# Patient Record
Sex: FEMALE | HISPANIC OR LATINO
[De-identification: names, ages, dates, MRNs, and addresses within clinical notes are randomized per-mention and may not be internally consistent; named-entity substitution may affect disease eponyms.]

---

## 2017-06-26 ENCOUNTER — HOSPITAL (OUTPATIENT)
Dept: OTHER | Age: 42
End: 2017-06-26
Attending: OTOLARYNGOLOGY

## 2018-05-14 PROBLEM — M72.2 PLANTAR FASCIITIS, BILATERAL: Status: ACTIVE | Noted: 2018-05-14

## 2018-05-14 PROBLEM — B35.3 TINEA PEDIS OF BOTH FEET: Status: ACTIVE | Noted: 2018-05-14

## 2019-03-18 ENCOUNTER — OFFICE VISIT (OUTPATIENT)
Dept: PHYSICAL THERAPY | Facility: HOSPITAL | Age: 44
End: 2019-03-18
Attending: FAMILY MEDICINE
Payer: COMMERCIAL

## 2019-03-18 PROCEDURE — 97162 PT EVAL MOD COMPLEX 30 MIN: CPT

## 2019-03-18 PROCEDURE — 97110 THERAPEUTIC EXERCISES: CPT

## 2019-03-18 NOTE — PROGRESS NOTES
SHOULDER EVALUATION:    Referring Haily Bruce MD      Insurance  : [facility] PPO         DX:M25.512 Left shoulder pain PT DX: Impingement Syndrome, posture syndrome,n   Jigar Ferrara MD       Age: 43 Occupation:psychologist,     DOI: about a m (Degrees) (R) (L) Strength:    MMT (Scale of 0-5) (R) (L)   Position: Active Passive Active Passive Shoulder flexion:  5 4+   Shoulder flexion: 135 165  120pn 125pn  Shoulder extension: 5 5-   Shoulder extension: 60 62  52pn 53pn  Abduction: 5 4+pn   Abduc In agreement with FOTO score and clinical rationale, this evaluation involved Moderate Complexity decision making due to 1-2 personal factors/comorbidities, 3 body structures involved/activity limitations, and evolving symptoms including changing pain leve · Pt will improve shoulder abduction AROM to 140> degrees to improve ability to don deodorant, don/doff shirts, and comb hair  · Pt will increase right shoulder painfree AROM to 85 deg ER and 130 deg ABD to reach and fasten seatbelt   · Pt will increase sh

## 2019-03-20 ENCOUNTER — OFFICE VISIT (OUTPATIENT)
Dept: PHYSICAL THERAPY | Facility: HOSPITAL | Age: 44
End: 2019-03-20
Attending: FAMILY MEDICINE
Payer: COMMERCIAL

## 2019-03-20 PROCEDURE — 97110 THERAPEUTIC EXERCISES: CPT

## 2019-03-20 PROCEDURE — 97140 MANUAL THERAPY 1/> REGIONS: CPT

## 2019-03-20 NOTE — PROGRESS NOTES
Diagnosis: M25.512 Left shoulder pain  Authorized # of Visits:  12 [employer] PPO         Next MD visit: none scheduled  Fall Risk: standard         Precautions: None significant           Medication Changes since last visit?: No  Subjective: Pt states that Rip Turner · Pt will improve shoulder strength throughout to 4+/5 to improve function with ADLs including lifting plates and light objects  · Pt will demonstrate increased mid/low trap strength to 4/5 to promote improved shoulder mechanics and stabilization with AD

## 2019-03-25 ENCOUNTER — OFFICE VISIT (OUTPATIENT)
Dept: PHYSICAL THERAPY | Facility: HOSPITAL | Age: 44
End: 2019-03-25
Attending: FAMILY MEDICINE
Payer: COMMERCIAL

## 2019-03-25 PROCEDURE — 97140 MANUAL THERAPY 1/> REGIONS: CPT

## 2019-03-25 PROCEDURE — 97110 THERAPEUTIC EXERCISES: CPT

## 2019-03-25 NOTE — PROGRESS NOTES
Diagnosis: M25.512 Left shoulder pain  Authorized # of Visits:  12 [employer] PPO         Next MD visit: none scheduled  Fall Risk: standard         Precautions: None significant           Medication Changes since last visit?: No  Subjective: Pt states that pain · Pt will improve shoulder flexion PROM to > 130 degrees and AROM to 135 deg to be able to reach into overhead cabinets to grab plates  · Pt will improve shoulder abduction AROM to 140> degrees to improve ability to don deodorant, don/doff shirts, and comb

## 2019-03-27 ENCOUNTER — OFFICE VISIT (OUTPATIENT)
Dept: PHYSICAL THERAPY | Facility: HOSPITAL | Age: 44
End: 2019-03-27
Attending: FAMILY MEDICINE
Payer: COMMERCIAL

## 2019-03-27 PROCEDURE — 97140 MANUAL THERAPY 1/> REGIONS: CPT

## 2019-03-27 PROCEDURE — 97110 THERAPEUTIC EXERCISES: CPT

## 2019-03-27 NOTE — PROGRESS NOTES
Diagnosis: M25.512 Left shoulder pain  Authorized # of Visits:  12 [employer] PPO         Next MD visit: none scheduled  Fall Risk: standard         Precautions: None significant           Medication Changes since last visit?: No  Subjective: Pt states that L sh posture with lumbar support, supine lying with cervical roll   HEP:  Chin tucks with neck extensions  Icing post ex. HEP:           Assessment: Pt with flareup over weekend , likely impingement from anterior humeral head position.  Pt experienced L UE symp

## 2019-04-01 ENCOUNTER — OFFICE VISIT (OUTPATIENT)
Dept: PHYSICAL THERAPY | Facility: HOSPITAL | Age: 44
End: 2019-04-01
Attending: FAMILY MEDICINE
Payer: COMMERCIAL

## 2019-04-01 PROCEDURE — 97140 MANUAL THERAPY 1/> REGIONS: CPT

## 2019-04-01 PROCEDURE — 97110 THERAPEUTIC EXERCISES: CPT

## 2019-04-03 ENCOUNTER — OFFICE VISIT (OUTPATIENT)
Dept: PHYSICAL THERAPY | Facility: HOSPITAL | Age: 44
End: 2019-04-03
Attending: FAMILY MEDICINE
Payer: COMMERCIAL

## 2019-04-03 PROCEDURE — 97140 MANUAL THERAPY 1/> REGIONS: CPT

## 2019-04-03 PROCEDURE — 97110 THERAPEUTIC EXERCISES: CPT

## 2019-04-03 NOTE — PROGRESS NOTES
Diagnosis: M25.512 Left shoulder pain  Authorized # of Visits:  12 [employer] PPO         Next MD visit: none scheduled  Fall Risk: standard         Precautions: None significant           Medication Changes since last visit?: No  Subjective: Pt states that L sh cerv retract/ext x 10-decr,  -Seated cerv retract/ext/rot-decr, B  -Seated thoracic ext over ball on chair.  2 x 10  -Supine pec stretch on 1/2 foam roll x 2 min  -Open books x 10; then 15\" hold x 1 r/l     -Instructed in proper donning of jacket to protec training, manual therapy, balance and proprioception training. .        Charges:  Therex2, man x 1       Total Timed Treatment: 42min  Total Treatment Time: 44 min

## 2019-04-08 ENCOUNTER — OFFICE VISIT (OUTPATIENT)
Dept: PHYSICAL THERAPY | Facility: HOSPITAL | Age: 44
End: 2019-04-08
Attending: FAMILY MEDICINE
Payer: COMMERCIAL

## 2019-04-08 PROCEDURE — 97140 MANUAL THERAPY 1/> REGIONS: CPT

## 2019-04-08 PROCEDURE — 97110 THERAPEUTIC EXERCISES: CPT

## 2019-04-08 NOTE — PROGRESS NOTES
Diagnosis: M25.512 Left shoulder pain  Authorized # of Visits:  12 [employer] PPO         Next MD visit: none scheduled  Fall Risk: standard         Precautions: None significant           Medication Changes since last visit?: No  Subjective: Pt states that she 10  -Stabilizer PB 20-22mmHg chin nods 3 x 10  -Open books 5\" x 10 r/l  -Supine pec stretch on 1/2 FR x 2 min  -Supine 1/2 FR snow angels x 5  -Prone W 2 x 10     EX:  -Supine 90/90 IR to neutral RTB 2 x 10  -S/L ER towel in axilla 1# 3 x 10  -S/L sh abd

## 2019-04-10 ENCOUNTER — OFFICE VISIT (OUTPATIENT)
Dept: PHYSICAL THERAPY | Facility: HOSPITAL | Age: 44
End: 2019-04-10
Attending: FAMILY MEDICINE
Payer: COMMERCIAL

## 2019-04-10 PROCEDURE — 97110 THERAPEUTIC EXERCISES: CPT

## 2019-04-10 PROCEDURE — 97140 MANUAL THERAPY 1/> REGIONS: CPT

## 2019-04-10 NOTE — PROGRESS NOTES
Patient Name: Marcia Green, : 1975, MRN: [id number]   Date:  4/10/2019  Referring Physician:  Janette Ramos    Diagnosis: M25.512 Left shoulder pain    Progress Summary    Pt has attended 8, cancelled 0, and no shown 0 visits in Physical Therapy reach into overhead cabinets to grab plates  Mostly MET  · Pt will improve shoulder abduction AROM to 140> degrees to improve ability to don deodorant, don/doff shirts, and comb hair  MET  · Pt will increase right shoulder painfree AROM to 85 deg ER and 13 MT:  -Manual cerv traction  -DUANE jjessie mobs L inf and post glides MT:   - jt mobs L inf and post glides grade III  -Pec minor stretch 30\" x 4   -LLLD stretch for ER  -Manual cerv traction  -CFM massage f/b ice massage to L biceps tendon MT:  -continued [address]

## 2019-04-24 ENCOUNTER — OFFICE VISIT (OUTPATIENT)
Dept: PHYSICAL THERAPY | Facility: HOSPITAL | Age: 44
End: 2019-04-24
Attending: FAMILY MEDICINE
Payer: COMMERCIAL

## 2019-04-24 PROCEDURE — 97110 THERAPEUTIC EXERCISES: CPT

## 2019-04-24 PROCEDURE — 97140 MANUAL THERAPY 1/> REGIONS: CPT

## 2019-04-24 NOTE — PROGRESS NOTES
Diagnosis: M25.512 Left shoulder pain  Authorized # of Visits:  12 [employer] PPO         Next MD visit: none scheduled  Fall Risk: standard         Precautions: None significant           Medication Changes since last visit?: No  Subjective: Pt states that her tolerate position    HEP: B ER with red TB; issued red theraband  HEP: Thoracic ext over ball on chair, prone horz abd, B ER with red TB           Assessment: Pt is reporting improving function; still has pain with protraction.   Pt reports exacerbation of

## 2019-04-29 ENCOUNTER — OFFICE VISIT (OUTPATIENT)
Dept: PHYSICAL THERAPY | Facility: HOSPITAL | Age: 44
End: 2019-04-29
Attending: FAMILY MEDICINE
Payer: COMMERCIAL

## 2019-04-29 PROCEDURE — 97110 THERAPEUTIC EXERCISES: CPT

## 2019-04-29 PROCEDURE — 97140 MANUAL THERAPY 1/> REGIONS: CPT

## 2019-04-29 NOTE — PROGRESS NOTES
Diagnosis: M25.512 Left shoulder pain  Authorized # of Visits:  12 [employer] PPO         Next MD visit: none scheduled  Fall Risk: standard         Precautions: None significant           Medication Changes since last visit?: No  Subjective: Pt states that her protraction and hand behind back at times. Pt reports improvement in ability to perform ADL's.     Goals:         Pt  will report improved ability to sleep without waking due to shoulder pain  · Patient will be able to turn head to right and left approximat

## 2019-05-01 ENCOUNTER — OFFICE VISIT (OUTPATIENT)
Dept: PHYSICAL THERAPY | Facility: HOSPITAL | Age: 44
End: 2019-05-01
Attending: FAMILY MEDICINE
Payer: COMMERCIAL

## 2019-05-01 PROCEDURE — 97140 MANUAL THERAPY 1/> REGIONS: CPT

## 2019-05-01 PROCEDURE — 97110 THERAPEUTIC EXERCISES: CPT

## 2019-05-01 NOTE — PROGRESS NOTES
Patient Name: General Nichols : 1975, MRN: [id number]   Date:  2019  Referring Physician:  Vanessa Bhatia MD  Fax: [phone]    Diagnosis: Left shoulder Pain    Progress Summary    Pt has attended 11, cancelled 1, and no shown 0 visits in abnormal   Apprehension:  - -   - Joint Mobility: NT  decreased post glide   Decreased post glide   Willson’s:      NT  Lift off test  NT NT     Cervical distraction test descreased p/n LUE     Decreased symptoms          ULTT median N. + left     NT   with ADL such as lifting and reaching  MOSTLY MET  · Pt will be independent and compliant with comprehensive HEP to maintain progress achieved in PT  MET    Plan: May benefit from additional 4-6 sessions of  PT with Dr's permission. ( see assessment)  Will 42min  Total Treatment Time: 46 min

## 2019-05-03 ENCOUNTER — TELEPHONE (OUTPATIENT)
Dept: PHYSICAL THERAPY | Facility: HOSPITAL | Age: 44
End: 2019-05-03

## 2019-05-06 ENCOUNTER — TELEPHONE (OUTPATIENT)
Dept: PHYSICAL THERAPY | Facility: HOSPITAL | Age: 44
End: 2019-05-06

## 2019-10-09 ENCOUNTER — OFFICE VISIT (OUTPATIENT)
Dept: INTERNAL MEDICINE CLINIC | Facility: CLINIC | Age: 44
End: 2019-10-09
Payer: COMMERCIAL

## 2019-10-09 VITALS
DIASTOLIC BLOOD PRESSURE: 72 MMHG | BODY MASS INDEX: 29.99 KG/M2 | HEIGHT: 63.5 IN | WEIGHT: 171.38 LBS | HEART RATE: 86 BPM | SYSTOLIC BLOOD PRESSURE: 100 MMHG | OXYGEN SATURATION: 98 % | TEMPERATURE: 98 F

## 2019-10-09 DIAGNOSIS — R21 RASH: ICD-10-CM

## 2019-10-09 DIAGNOSIS — Z12.39 SCREENING FOR BREAST CANCER: ICD-10-CM

## 2019-10-09 DIAGNOSIS — Z12.4 SCREENING FOR CERVICAL CANCER: ICD-10-CM

## 2019-10-09 DIAGNOSIS — M25.512 LEFT SHOULDER PAIN, UNSPECIFIED CHRONICITY: ICD-10-CM

## 2019-10-09 DIAGNOSIS — Z00.00 ANNUAL PHYSICAL EXAM: Primary | ICD-10-CM

## 2019-10-09 PROBLEM — M72.2 PLANTAR FASCIITIS, BILATERAL: Status: RESOLVED | Noted: 2018-05-14 | Resolved: 2019-10-09

## 2019-10-09 PROCEDURE — 99386 PREV VISIT NEW AGE 40-64: CPT | Performed by: INTERNAL MEDICINE

## 2019-10-09 RX ORDER — CLOTRIMAZOLE AND BETAMETHASONE DIPROPIONATE 10; .64 MG/G; MG/G
CREAM TOPICAL
Qty: 60 G | Refills: 3 | Status: SHIPPED | OUTPATIENT
Start: 2019-10-09 | End: 2020-09-21 | Stop reason: ALTCHOICE

## 2019-10-09 RX ORDER — FLUTICASONE PROPIONATE 50 MCG
SPRAY, SUSPENSION (ML) NASAL
COMMUNITY
End: 2020-11-02

## 2019-10-09 RX ORDER — NAPROXEN 500 MG/1
500 TABLET ORAL AS NEEDED
COMMUNITY

## 2019-10-10 NOTE — PROGRESS NOTES
HPI:   Rocky Bedoya is a 45year old female who presents for a complete physical exam.Her period is regular ,   She is complaining of rash on bilateral arms which is ongoing for couple of weeks. She tried over-the-counter medication but did not help. ENMT Negative Hearing loss, nasal drainage, pain in/around ear, sinus pressure and sore throat. Eyes Negative Eye pain and vision changes. Respiratory Negative Cough, dyspnea and wheezing.    Cardio Negative Chest pain, claudication, edema and irregul Dorsalis pedis: Normal. Bruits - Carotids: Absent. Extremity Normal No edema. No varicosities. Abdomen Normal Inspection normal, BS active.  No abdominal tenderness or masses palpable   Genitourinary Normal External genitalia, urethra - Normal. Vagina,

## 2019-10-10 NOTE — PATIENT INSTRUCTIONS
israel Redmond is a 45year old female who presents for a complete physical exam. Pap and pelvic done. Self breast exam explained.    Health maintenance: patient is due for  Pap mere done today   Rash on bilateral arms -try topical clotrimazole betamethason

## 2019-10-21 ENCOUNTER — HOSPITAL ENCOUNTER (OUTPATIENT)
Dept: MAMMOGRAPHY | Facility: HOSPITAL | Age: 44
Discharge: HOME | End: 2019-10-21
Attending: INTERNAL MEDICINE
Payer: COMMERCIAL

## 2019-10-21 ENCOUNTER — LAB ENCOUNTER (OUTPATIENT)
Dept: LAB | Facility: HOSPITAL | Age: 44
End: 2019-10-21
Attending: INTERNAL MEDICINE
Payer: COMMERCIAL

## 2019-10-21 DIAGNOSIS — Z12.39 SCREENING FOR BREAST CANCER: ICD-10-CM

## 2019-10-21 DIAGNOSIS — Z00.00 ANNUAL PHYSICAL EXAM: ICD-10-CM

## 2019-10-21 PROCEDURE — 80061 LIPID PANEL: CPT

## 2019-10-21 PROCEDURE — 77063 BREAST TOMOSYNTHESIS BI: CPT | Performed by: INTERNAL MEDICINE

## 2019-10-21 PROCEDURE — 83036 HEMOGLOBIN GLYCOSYLATED A1C: CPT

## 2019-10-21 PROCEDURE — 84443 ASSAY THYROID STIM HORMONE: CPT

## 2019-10-21 PROCEDURE — 80053 COMPREHEN METABOLIC PANEL: CPT

## 2019-10-21 PROCEDURE — 85025 COMPLETE CBC W/AUTO DIFF WBC: CPT

## 2019-10-21 PROCEDURE — 36415 COLL VENOUS BLD VENIPUNCTURE: CPT

## 2019-10-21 PROCEDURE — 77067 SCR MAMMO BI INCL CAD: CPT | Performed by: INTERNAL MEDICINE

## 2019-11-29 ENCOUNTER — HOSPITAL ENCOUNTER (EMERGENCY)
Facility: HOSPITAL | Age: 44
Discharge: HOME | End: 2019-11-29
Attending: EMERGENCY MEDICINE
Payer: COMMERCIAL

## 2019-11-29 ENCOUNTER — APPOINTMENT (OUTPATIENT)
Dept: ULTRASOUND IMAGING | Facility: HOSPITAL | Age: 44
End: 2019-11-29
Attending: EMERGENCY MEDICINE
Payer: COMMERCIAL

## 2019-11-29 ENCOUNTER — NURSE TRIAGE (OUTPATIENT)
Dept: OTHER | Age: 44
End: 2019-11-29

## 2019-11-29 VITALS
RESPIRATION RATE: 18 BRPM | DIASTOLIC BLOOD PRESSURE: 77 MMHG | HEART RATE: 80 BPM | HEIGHT: 64 IN | BODY MASS INDEX: 28.17 KG/M2 | WEIGHT: 165 LBS | TEMPERATURE: 98 F | OXYGEN SATURATION: 98 % | SYSTOLIC BLOOD PRESSURE: 120 MMHG

## 2019-11-29 DIAGNOSIS — N20.1 URETEROLITHIASIS: Primary | ICD-10-CM

## 2019-11-29 PROCEDURE — 81001 URINALYSIS AUTO W/SCOPE: CPT | Performed by: EMERGENCY MEDICINE

## 2019-11-29 PROCEDURE — 85025 COMPLETE CBC W/AUTO DIFF WBC: CPT | Performed by: EMERGENCY MEDICINE

## 2019-11-29 PROCEDURE — 76775 US EXAM ABDO BACK WALL LIM: CPT | Performed by: EMERGENCY MEDICINE

## 2019-11-29 PROCEDURE — 81001 URINALYSIS AUTO W/SCOPE: CPT

## 2019-11-29 PROCEDURE — 80048 BASIC METABOLIC PNL TOTAL CA: CPT | Performed by: EMERGENCY MEDICINE

## 2019-11-29 PROCEDURE — 85025 COMPLETE CBC W/AUTO DIFF WBC: CPT

## 2019-11-29 PROCEDURE — 99284 EMERGENCY DEPT VISIT MOD MDM: CPT

## 2019-11-29 PROCEDURE — 80048 BASIC METABOLIC PNL TOTAL CA: CPT

## 2019-11-29 PROCEDURE — 96374 THER/PROPH/DIAG INJ IV PUSH: CPT

## 2019-11-29 PROCEDURE — 81025 URINE PREGNANCY TEST: CPT

## 2019-11-29 RX ORDER — KETOROLAC TROMETHAMINE 30 MG/ML
30 INJECTION, SOLUTION INTRAMUSCULAR; INTRAVENOUS ONCE
Status: COMPLETED | OUTPATIENT
Start: 2019-11-29 | End: 2019-11-29

## 2019-11-29 RX ORDER — KETOROLAC TROMETHAMINE 10 MG/1
10 TABLET, FILM COATED ORAL EVERY 6 HOURS PRN
Qty: 20 TABLET | Refills: 0 | Status: SHIPPED | OUTPATIENT
Start: 2019-11-29 | End: 2019-12-04

## 2019-11-29 RX ORDER — HYDROCODONE BITARTRATE AND ACETAMINOPHEN 5; 325 MG/1; MG/1
1 TABLET ORAL EVERY 6 HOURS PRN
Qty: 10 TABLET | Refills: 0 | Status: SHIPPED | OUTPATIENT
Start: 2019-11-29 | End: 2020-03-03 | Stop reason: ALTCHOICE

## 2019-11-29 RX ORDER — TAMSULOSIN HYDROCHLORIDE 0.4 MG/1
0.4 CAPSULE ORAL DAILY
Qty: 14 CAPSULE | Refills: 0 | Status: SHIPPED | OUTPATIENT
Start: 2019-11-29 | End: 2019-12-13

## 2019-11-29 RX ORDER — ONDANSETRON 2 MG/ML
4 INJECTION INTRAMUSCULAR; INTRAVENOUS ONCE
Status: DISCONTINUED | OUTPATIENT
Start: 2019-11-29 | End: 2019-11-29

## 2019-11-29 NOTE — TELEPHONE ENCOUNTER
----- Message from Reuben Cheadle sent at 11/29/2019  4:09 PM CST -----  Regarding: Non-Urgent Medical Question  Contact: [phone]  Hi Doctor- I am having sharp pains on my right side below my ribs as well as dull pain the right upper side of ribs.  I

## 2019-11-29 NOTE — TELEPHONE ENCOUNTER
Action Requested: Summary for Provider     []  Critical Lab, Recommendations Needed  [] Need Additional Advice  []   FYI    []   Need Orders  [] Need Medications Sent to Pharmacy  []  Other     SUMMARY: pt states she is having right sided upper abdominal p

## 2019-11-30 NOTE — TELEPHONE ENCOUNTER
Patient seen at [facility] ER yesterday. Disposition and Plan    Clinical Impression:  Ureterolithiasis  (primary encounter diagnosis)     Disposition:  Discharge     Follow-up:   Jamilah Alfred Ra [id number] Aranda

## 2019-12-02 ENCOUNTER — OFFICE VISIT (OUTPATIENT)
Dept: DERMATOLOGY CLINIC | Facility: CLINIC | Age: 44
End: 2019-12-02
Payer: COMMERCIAL

## 2019-12-02 DIAGNOSIS — L30.8 PSORIASIFORM DERMATITIS: ICD-10-CM

## 2019-12-02 DIAGNOSIS — L82.1 SEBORRHEIC KERATOSES: ICD-10-CM

## 2019-12-02 DIAGNOSIS — L30.1 DYSHIDROSIS (POMPHOLYX): Primary | ICD-10-CM

## 2019-12-02 DIAGNOSIS — D22.9 MULTIPLE NEVI: ICD-10-CM

## 2019-12-02 DIAGNOSIS — D23.9 BENIGN NEOPLASM OF SKIN, UNSPECIFIED LOCATION: ICD-10-CM

## 2019-12-02 PROCEDURE — 99203 OFFICE O/P NEW LOW 30 MIN: CPT | Performed by: DERMATOLOGY

## 2019-12-02 RX ORDER — AMMONIUM LACTATE 12 G/100G
1 CREAM TOPICAL 2 TIMES DAILY
Qty: 385 G | Refills: 11 | Status: SHIPPED | OUTPATIENT
Start: 2019-12-02 | End: 2020-01-01

## 2019-12-02 RX ORDER — FLUOCINONIDE 0.5 MG/G
OINTMENT TOPICAL
Qty: 60 G | Refills: 3 | Status: SHIPPED | OUTPATIENT
Start: 2019-12-02

## 2019-12-02 NOTE — TELEPHONE ENCOUNTER
If she does not want to follow-up with me after ER visit is fine she can schedule appointment with urology we usually want to see patient after ER visit just to make sure everything is okay.

## 2019-12-02 NOTE — TELEPHONE ENCOUNTER
Spoke with the patient and informed her of Dr. Jeanie Landry message from below. Patient voiced understanding and reports she will schedule an appointment with urology.

## 2019-12-02 NOTE — TELEPHONE ENCOUNTER
Patient reports has been doing better after ED visit, confirmed to have a kidney stone, has pain medication that has been helping to manage her pain.  Pt reports was given a urology referral for follow up appt, patient wanting to know if ok to follow up dir

## 2019-12-06 NOTE — ED PROVIDER NOTES
Patient Seen in: [facility] Emergency Department    History   Patient presents with:  Abdomen/Flank Pain (GI/)      HPI    Patient presents to the ED complaining of right-sided flank pain for the past 2 days. Pain is sharp and severe at times. appears well-developed and well-nourished. No distress. HENT:   Head: Normocephalic and atraumatic. Eyes: Conjunctivae are normal. Right eye exhibits no discharge. Left eye exhibits no discharge. Neck: No tracheal deviation present.    Cardiovascular: DRAW GOLD   CBC W/ DIFFERENTIAL         Imaging Results Available and Reviewed while in ED:    PROCEDURE:  US KIDNEYS (EBO=[id number])     COMPARISON: None.      INDICATIONS:  R flank pain     TECHNIQUE:    Ultrasound examination was performed to visualize the k consistent with hematuria and no infection. After informed decision-making the patient elected ultrasound imaging. Ultrasound shows no significant abnormalities. Likely stone passage versus lack of hydroureter at this time.   Patient comfort going home w Picato Counseling:  I discussed with the patient the risks of Picato including but not limited to erythema, scaling, itching, weeping, crusting, and pain.

## 2019-12-11 ENCOUNTER — OFFICE VISIT (OUTPATIENT)
Dept: PHYSICAL THERAPY | Age: 44
End: 2019-12-11
Attending: INTERNAL MEDICINE
Payer: COMMERCIAL

## 2019-12-11 DIAGNOSIS — M25.512 LEFT SHOULDER PAIN, UNSPECIFIED CHRONICITY: ICD-10-CM

## 2019-12-11 PROCEDURE — 97110 THERAPEUTIC EXERCISES: CPT

## 2019-12-11 PROCEDURE — 97161 PT EVAL LOW COMPLEX 20 MIN: CPT

## 2019-12-11 NOTE — PROGRESS NOTES
PT EVALUATION:   Referring Physician: Dr. Carly Galindo  Diagnosis:      Left shoulder pain, unspecified chronicity (M25.512) Date of Service: 12/11/2019     PATIENT SUMMARY   Belinda Connor is a 45year old female who presents to therapy today with complaints denies diplopia, dysarthria, dysphasia, dizziness, drop attacks, bowel/bladder changes, saddle anesthesia, and EMORY LE N/T.      ASSESSMENT  Meet Paul presents to physical therapy evaluation with primary c/o L shoulder pain with radiating symptoms into her L sh glenohumeral joint    Palpation: TTP at L upper trap    Strength: (* denotes performed with pain)  UE/Scapular   Shoulder Flex: R 5/5, L 4/5  Shoulder ABD (C5): R 5/5, L 4 -/5*  Shoulder Ext: R 5 , L 5  Shoulder IR: R 5, L 5  Shoulder ER: R 5, L 4 - *  Bic pain.      Frequency / Duration: Patient will be seen for 2 x/week or a total of 6 visits over a 90 day period.  Treatment will include: Manual Therapy, Neuromuscular Re-education, Self-Care Home Management, Therapeutic Activities, Therapeutic Exercise, Giulia

## 2019-12-15 NOTE — PROGRESS NOTES
Belinda Connor is a 45year old female. HPI:     CC:  Patient presents with:  Derm Problem: NEW PATIENT - Pt presenting for cracking skin on fingers, heels, and feet. Pt states cracks are deep and painful. Pt states they start like blisters.   Pt states 10 MG Oral Cap Take 10 mg by mouth.      • naproxen 500 MG Oral Tab naproxen 500 mg tablet   TK 1 T PO BID FOR 5 DAYS THEN PRN     • clotrimazole-betamethasone 1-0.05 % External Cream Apply to affected are bid 60 g 3   • loratadine 10 MG Oral Tab Take 10 mg organizations: Not on file        Relationship status: Not on file      Intimate partner violence:        Fear of current or ex partner: Not on file        Emotionally abused: Not on file        Physically abused: Not on file        Forced sexual activity: umbilicus       metronidazole and pimecrolimus prescribed for facial rash redness. Patient noted severe irritation with the pimecrolimus. Does not recall whether the metronidazole was helpful. Patient presents with concerns above.     Patient has been in of tapioca like vesicles and superficial papules pustules fissuring scaling noted. No evidence of tinea. Nails are fairly normal.  Hyper linearity, xerotic changes over the hands. Nails okay. Chronic psoriasiform dermatitis, dyshidrosis.   Mupirocin to above.    This note was generated using Dragon voice recognition software. Please contact me regarding any confusion resulting from errors in recognition. Damir Faye

## 2019-12-18 ENCOUNTER — HOSPITAL ENCOUNTER (OUTPATIENT)
Dept: CT IMAGING | Facility: HOSPITAL | Age: 44
Discharge: HOME | End: 2019-12-18
Attending: UROLOGY
Payer: COMMERCIAL

## 2019-12-18 DIAGNOSIS — R10.9 FLANK PAIN: ICD-10-CM

## 2019-12-18 DIAGNOSIS — N20.0 KIDNEY STONE: ICD-10-CM

## 2019-12-18 PROCEDURE — 74176 CT ABD & PELVIS W/O CONTRAST: CPT | Performed by: UROLOGY

## 2019-12-24 ENCOUNTER — OFFICE VISIT (OUTPATIENT)
Dept: PHYSICAL THERAPY | Age: 44
End: 2019-12-24
Attending: INTERNAL MEDICINE
Payer: COMMERCIAL

## 2019-12-24 PROCEDURE — 97140 MANUAL THERAPY 1/> REGIONS: CPT

## 2019-12-24 PROCEDURE — 97110 THERAPEUTIC EXERCISES: CPT

## 2019-12-24 NOTE — PROGRESS NOTES
Dx:     Left shoulder pain, unspecified chronicity (M25.512)     Insurance (Authorized # of Visits):  6 visits; cert ends 5/73/1340         Authorizing Physician: Dr. Angel Meier MD  Next MD visit: none scheduled  Fall Risk: standard         Precautions: n/a serratus 10x2  Supine: Cane shoulder flex 10x2  Standing: shoulder flex wall slide 10x2  Codman's pendulum 20x each dir  Sitting: shoulder flex finger wiggles 10x2  Sitting: scap retraction 10x2       Manual Therapy  Sidelying: L scapulothoracic mobs all d

## 2019-12-31 ENCOUNTER — OFFICE VISIT (OUTPATIENT)
Dept: PHYSICAL THERAPY | Age: 44
End: 2019-12-31
Attending: INTERNAL MEDICINE
Payer: COMMERCIAL

## 2019-12-31 PROCEDURE — 97110 THERAPEUTIC EXERCISES: CPT

## 2019-12-31 PROCEDURE — 97140 MANUAL THERAPY 1/> REGIONS: CPT

## 2019-12-31 NOTE — PROGRESS NOTES
Dx:     Left shoulder pain, unspecified chronicity (M25.512)     Insurance (Authorized # of Visits):  6 visits; cert ends 9/30/2506         Authorizing Physician: Dr. Kathryn Grossman MD  Next MD visit: none scheduled  Fall Risk: standard         Precautions: n/a 12/24/2019  TX#: 2/6 Date: 12/31/2019              TX#: 3/6 Date:                 TX#: 4/ Date:                 TX#: 5/ Date:    Tx#: 6/   Therapeutic Exercise  Supine: cane serratus 10x2  Supine: Cane shoulder flex 10x2  Standing: shoulder flex wall slide

## 2020-01-08 ENCOUNTER — OFFICE VISIT (OUTPATIENT)
Dept: PHYSICAL THERAPY | Age: 45
End: 2020-01-08
Attending: INTERNAL MEDICINE
Payer: COMMERCIAL

## 2020-01-08 PROCEDURE — 97110 THERAPEUTIC EXERCISES: CPT

## 2020-01-08 PROCEDURE — 97140 MANUAL THERAPY 1/> REGIONS: CPT

## 2020-01-08 NOTE — PROGRESS NOTES
Dx:     Left shoulder pain, unspecified chronicity (M25.512)     Insurance (Authorized # of Visits):  6 visits; cert ends 6/90/0311         Authorizing Physician: Dr. Cherie Mcdonough MD  Next MD visit: none scheduled  Fall Risk: standard         Precautions: n/a flex 10x2  Standing: shoulder flex wall slide 10x2  Codman's pendulum 20x each dir  Sitting: shoulder flex finger wiggles 10x2  Sitting: scap retraction 10x2 Therapeutic Exercise  Supine: cane serratus 10x2  Supine: Cane shoulder flex 10x2  Standing: shoul

## 2020-01-15 ENCOUNTER — OFFICE VISIT (OUTPATIENT)
Dept: PHYSICAL THERAPY | Age: 45
End: 2020-01-15
Attending: INTERNAL MEDICINE
Payer: COMMERCIAL

## 2020-01-15 PROCEDURE — 97110 THERAPEUTIC EXERCISES: CPT

## 2020-01-15 PROCEDURE — 97140 MANUAL THERAPY 1/> REGIONS: CPT

## 2020-01-15 NOTE — PROGRESS NOTES
Dx:     Left shoulder pain, unspecified chronicity (M25.512)     Insurance (Authorized # of Visits):  6 visits; cert ends 3/37/0092         Authorizing Physician: Dr. Amber Pleitez MD  Next MD visit: none scheduled  Fall Risk: standard         Precautions: n/a Date:   Tx#: 6/   Therapeutic Exercise  Supine: cane serratus 10x2  Supine: Cane shoulder flex 10x2  Standing: shoulder flex wall slide 10x2  Codman's pendulum 20x each dir  Sitting: shoulder flex finger wiggles 10x2  Sitting: scap retraction 10x2 Therapeu

## 2020-01-22 ENCOUNTER — OFFICE VISIT (OUTPATIENT)
Dept: PHYSICAL THERAPY | Age: 45
End: 2020-01-22
Attending: INTERNAL MEDICINE
Payer: COMMERCIAL

## 2020-01-22 PROCEDURE — 97110 THERAPEUTIC EXERCISES: CPT

## 2020-01-22 PROCEDURE — 97140 MANUAL THERAPY 1/> REGIONS: CPT

## 2020-01-22 NOTE — PROGRESS NOTES
Progress Summary  Pt has attended 6 visits in Physical Therapy.    Dx:     Left shoulder pain, unspecified chronicity (M25.512)     Insurance (Authorized # of Visits):  6 visits; cert ends 2/70/3194         Authorizing Physician: Dr. Eileen Reina MD  Next MD vis 5/5  Low trap: R 5/5; L 3 -/5*  Serratus Anterior: R 5/5, L 3+/5*       Assessment: Patient has attended 6 PT visits and has responded well with signs of increased L shoulder ROM and L UE strength Her FOTO score improved from 53% to 68% functional ability. NORI Ha     Physician's certification required:  Yes  Please co-sign or sign and return this letter via fax as soon as possible to [phone]. I certify the need for these services furnished under this plan of treatment and while under my care. 6 min Manual Therapy  Sidelying: L scapulothoracic mobs all dir x 5 min  Supine: PROM at L shoulder all dir x 6 min Manual Therapy  Sidelying: L scapulothoracic mobs all dir x 6 min  Supine: PROM at L shoulder all dir x 8 min Manual Therapy  Sidelying: L s

## 2020-01-29 ENCOUNTER — OFFICE VISIT (OUTPATIENT)
Dept: PHYSICAL THERAPY | Age: 45
End: 2020-01-29
Attending: INTERNAL MEDICINE
Payer: COMMERCIAL

## 2020-01-29 PROCEDURE — 97110 THERAPEUTIC EXERCISES: CPT

## 2020-01-29 PROCEDURE — 97140 MANUAL THERAPY 1/> REGIONS: CPT

## 2020-01-29 NOTE — PROGRESS NOTES
Dx:     Left shoulder pain, unspecified chronicity (M25.512)     Insurance (Authorized # of Visits):  14 visits; cert ends 2/43/1473         Authorizing Physician: Dr. Carly Galindo MD  Next MD visit: none scheduled  Fall Risk: standard         Precautions: n/a with wall slides and UT shoulder shrugs/lower trap y's to improve UE elevation. Goals: (to be met in 14 visits)   1.  Patient will be independent with HEP to optimize gains made in PT to home and community settings and for self management of prophylacti wall 10x2  Standing: scap rows red tband 20x  Standing: shoulder ext red tband 20x  Standing: ER red tband red tband 20x  Standing: thoracic flexion with arm reach 10x1 Therapeutic exercise  Wall slides shoulder flexion 10x2  UT shoulder shrugs at wall 10x

## 2020-02-05 ENCOUNTER — OFFICE VISIT (OUTPATIENT)
Dept: PHYSICAL THERAPY | Age: 45
End: 2020-02-05
Attending: INTERNAL MEDICINE
Payer: COMMERCIAL

## 2020-02-05 PROCEDURE — 97110 THERAPEUTIC EXERCISES: CPT

## 2020-02-05 PROCEDURE — 97140 MANUAL THERAPY 1/> REGIONS: CPT

## 2020-02-05 NOTE — PROGRESS NOTES
Dx:     Left shoulder pain, unspecified chronicity (M25.512)     Insurance (Authorized # of Visits):  14 visits; cert ends 4/15/1183         Authorizing Physician: Dr. Ancelmo Puente MD  Next MD visit: none scheduled  Fall Risk: standard         Precautions: n/a Ext (C8): R 5/5, L 5/5  Interossei (T1): R 5/5, L 5/5     Rhomboids: R 5/5, L 5/5  Mid trap: R 5/5; L 4/5*  Lats: R 5/5, L 5/5  Low trap: R 5/5; L 3 -/5*  Serratus Anterior: R 5/5, L 3+/5*     Repeated Cervical movements:  1.  Cervical retraction in sitting red tband red tband 20x  Sitting: Cane shoulder flex 20x  Sitting: Cane OH press 20x  Standing: thoracic flexion with arm reach 10x1 Therapeutic exercise  Wall slides shoulder flexion 10x2  UT shoulder shrugs at wall 10x5\"  Lower trap Y's at wall [employer]

## 2020-02-12 ENCOUNTER — OFFICE VISIT (OUTPATIENT)
Dept: PHYSICAL THERAPY | Age: 45
End: 2020-02-12
Attending: INTERNAL MEDICINE
Payer: COMMERCIAL

## 2020-02-12 PROCEDURE — 97110 THERAPEUTIC EXERCISES: CPT

## 2020-02-12 NOTE — PROGRESS NOTES
Dx:     Left shoulder pain, unspecified chronicity (M25.512)     Insurance (Authorized # of Visits):  14 visits; cert ends 0/02/2668         Authorizing Physician: Dr. Renetta Long MD  Next MD visit: none scheduled  Fall Risk: standard         Precautions: n/a Ext (C8): R 5/5, L 5/5  Interossei (T1): R 5/5, L 5/5     Rhomboids: R 5/5, L 5/5  Mid trap: R 5/5; L 4/5*  Lats: R 5/5, L 5/5  Low trap: R 5/5; L 3 -/5*  Serratus Anterior: R 5/5, L 3+/5*         Assessment: TTP at L AC joint, but symptoms are localized c with arm reach 10x1 Therapeutic exercise  Wall slides shoulder flexion 10x2  UT shoulder shrugs at wall 10x5\"  Lower trap Y's at wall 10x2  Standing: scap rows red tband 20x  Standing: shoulder ext red tband 20x  Standing: ER red tband red tband 20x  Cayla Yancey

## 2020-02-19 ENCOUNTER — OFFICE VISIT (OUTPATIENT)
Dept: PHYSICAL THERAPY | Age: 45
End: 2020-02-19
Attending: INTERNAL MEDICINE
Payer: COMMERCIAL

## 2020-02-19 PROCEDURE — 97110 THERAPEUTIC EXERCISES: CPT

## 2020-02-19 NOTE — PROGRESS NOTES
Dx:     Left shoulder pain, unspecified chronicity (M25.512)     Insurance (Authorized # of Visits):  [employer] PPO 14 visits; cert ends 2/43/5559         Authorizing Physician: Dr. Nohemy Mars MD  Next MD visit: none scheduled  Fall Risk: standard         Precautio (C7): R 5/5, L 5/5  Digit Flex (C8): R 5/5, L 5/5  Thumb Ext (C8): R 5/5, L 5/5  Interossei (T1): R 5/5, L 5/5     Rhomboids: R 5/5, L 5/5  Mid trap: R 5/5; L 4/5*  Lats: R 5/5, L 5/5  Low trap: R 5/5; L 3 -/5*  Serratus Anterior: R 5/5, L 3+/5*         As 10x2  Standing: scap rows red tband 20x  Standing: shoulder ext red tband 20x  Standing: ER red tband red tband 20x  Standing: thoracic flexion with arm reach 10x1 Therapeutic exercise  Repeated cervical movement testing: see above  Wall slides shoulder fl 3TE     Total Timed Treatment: 42 min  Total Treatment Time: 42 min

## 2020-02-26 ENCOUNTER — OFFICE VISIT (OUTPATIENT)
Dept: PHYSICAL THERAPY | Age: 45
End: 2020-02-26
Attending: INTERNAL MEDICINE
Payer: COMMERCIAL

## 2020-02-26 PROCEDURE — 97110 THERAPEUTIC EXERCISES: CPT

## 2020-02-26 NOTE — PROGRESS NOTES
Discharge Summary  Pt has attended 11 visits in Physical Therapy.    Dx:     Left shoulder pain, unspecified chronicity (M25.512)     Insurance (Authorized # of Visits):  [employer] PPO 14 visits; cert ends 0/00/2100         Authorizing Physician: Dr. Kathryn Grossman MD 5  Biceps (C6): R 5/5, L 5/5  Wrist ext (C6): R 5/5, L 5/5  Triceps (C7): R 5/5, L 5/5  Wrist Flex (C7): R 5/5, L 5/5  Digit Flex (C8): R 5/5, L 5/5  Thumb Ext (C8): R 5/5, L 5/5  Interossei (T1): R 5/5, L 5/5     Rhomboids: R 5/5, L 5/5  Mid trap: R 5/5; required:  No      Date: 1/29/2020  Tx#: 7/14 Date: 2/5/2020  Tx#: 8/14 Date: 2/12/2020  Tx#: 9/14 Date: 2/19/2020  Tx#: 10/14 Date: 2/26/2020  Tx#: 11/14   Therapeutic exercise  Wall slides shoulder flexion 10x2  UT shoulder shrugs at wall 10x5\"  Lower t x 6 min  Sidelying: MFR at L rhomboids, MT x 4 min Manual Therapy  Supine PROM at L shoulder all dir x 6 min Manual Therapy  Supine PROM at L shoulder all dir x 6 min Manual Therapy  Supine PROM at L shoulder all dir x 5 min                 HEP: scap retra

## 2020-02-28 ENCOUNTER — PATIENT MESSAGE (OUTPATIENT)
Dept: INTERNAL MEDICINE CLINIC | Facility: CLINIC | Age: 45
End: 2020-02-28

## 2020-02-28 NOTE — TELEPHONE ENCOUNTER
From: Yessenia Casas  To: Danny Adam MD  Sent: 2/28/2020 10:00 AM CST  Subject: Other    Good morning: Please add the information on the attachment to my EMR. It is confirmation of my most recent influenza vaccination. Thank you.    Garry Harrison

## 2020-03-03 ENCOUNTER — OFFICE VISIT (OUTPATIENT)
Dept: FAMILY MEDICINE CLINIC | Facility: CLINIC | Age: 45
End: 2020-03-03
Payer: COMMERCIAL

## 2020-03-03 VITALS
HEIGHT: 64 IN | DIASTOLIC BLOOD PRESSURE: 72 MMHG | WEIGHT: 167.81 LBS | BODY MASS INDEX: 28.65 KG/M2 | TEMPERATURE: 98 F | SYSTOLIC BLOOD PRESSURE: 104 MMHG | HEART RATE: 108 BPM | OXYGEN SATURATION: 99 %

## 2020-03-03 DIAGNOSIS — J02.9 SORE THROAT: ICD-10-CM

## 2020-03-03 DIAGNOSIS — J02.0 STREP PHARYNGITIS: Primary | ICD-10-CM

## 2020-03-03 LAB
CONTROL LINE PRESENT WITH A CLEAR BACKGROUND (YES/NO): YES YES/NO
KIT LOT #: ABNORMAL NUMERIC
STREP GRP A CUL-SCR: POSITIVE

## 2020-03-03 PROCEDURE — 87880 STREP A ASSAY W/OPTIC: CPT | Performed by: PHYSICIAN ASSISTANT

## 2020-03-03 PROCEDURE — 99202 OFFICE O/P NEW SF 15 MIN: CPT | Performed by: PHYSICIAN ASSISTANT

## 2020-03-03 RX ORDER — AMMONIUM LACTATE 12 G/100G
CREAM TOPICAL
COMMUNITY
Start: 2020-02-27 | End: 2020-03-21

## 2020-03-03 RX ORDER — AMOXICILLIN 875 MG/1
875 TABLET, COATED ORAL 2 TIMES DAILY
Qty: 20 TABLET | Refills: 0 | Status: SHIPPED | OUTPATIENT
Start: 2020-03-03 | End: 2020-03-13

## 2020-03-03 NOTE — PROGRESS NOTES
CHIEF COMPLAINT:   Patient presents with:  Sore Throat: X 1 day, face feels flushed, chills, headache,      HPI:   Madison Nevarez is a 45year old female who presents to clinic with symptoms of sore throat. Patient has had for less than 24 hours.  Symptoms GENERAL HEALTH: See HPI  SKIN: See HPI  HEENT: See HPI  RESPIRATORY: denies shortness of breath, or wheezing  CARDIOVASCULAR: denies chest pain, palpitations   GI: denies abdominal pain, constipation and diarrhea  NEURO: denies dizziness or lightheadedness Requested Prescriptions     Signed Prescriptions Disp Refills   • amoxicillin 875 MG Oral Tab 20 tablet 0     Sig: Take 1 tablet (875 mg total) by mouth 2 (two) times daily for 10 days. Meds as listed above.   Comfort measures as described in Patient You have had a positive test for strep throat. Strep throat is a contagious illness. It is spread by coughing, kissing or by touching others after touching your mouth or nose.  Symptoms include throat pain that is worse with swallowing, aching all over, hea · You can't swallow liquids or you can't open your mouth wide because of throat pain  · Signs of dehydration. These include very dark urine or no urine, sunken eyes, and dizziness.   · Trouble breathing or noisy breathing  · Muffled voice  · Rash  Preventio

## 2020-03-03 NOTE — PATIENT INSTRUCTIONS
· You are considered to be contagious until you have been on antibiotics for 24 hours. · You can return to school and/or work once on antibiotics for 24 hours. · Can use over the counter Tylenol/Ibuprofen as needed.   · Push fluids- warm or cool liquids GI bleeding. · Throat lozenges or sprays help reduce pain. Gargling with warm saltwater will also reduce throat pain. Dissolve 1/2 teaspoon of salt in 1 glass of warm water. This may be useful just before meals.   · Soft foods are OK.  Don't eat salty or s

## 2020-03-08 ENCOUNTER — E-VISIT (OUTPATIENT)
Dept: FAMILY MEDICINE CLINIC | Facility: CLINIC | Age: 45
End: 2020-03-08

## 2020-03-08 ENCOUNTER — PATIENT MESSAGE (OUTPATIENT)
Dept: INTERNAL MEDICINE CLINIC | Facility: CLINIC | Age: 45
End: 2020-03-08

## 2020-03-08 DIAGNOSIS — R05.9 COUGH: Primary | ICD-10-CM

## 2020-03-09 ENCOUNTER — TELEPHONE (OUTPATIENT)
Dept: INTERNAL MEDICINE CLINIC | Facility: CLINIC | Age: 45
End: 2020-03-09

## 2020-03-09 ENCOUNTER — OFFICE VISIT (OUTPATIENT)
Dept: INTERNAL MEDICINE CLINIC | Facility: CLINIC | Age: 45
End: 2020-03-09
Payer: COMMERCIAL

## 2020-03-09 VITALS
SYSTOLIC BLOOD PRESSURE: 100 MMHG | TEMPERATURE: 99 F | WEIGHT: 169 LBS | OXYGEN SATURATION: 98 % | DIASTOLIC BLOOD PRESSURE: 56 MMHG | RESPIRATION RATE: 18 BRPM | HEART RATE: 104 BPM | HEIGHT: 64 IN | BODY MASS INDEX: 28.85 KG/M2

## 2020-03-09 DIAGNOSIS — J06.9 URTI (ACUTE UPPER RESPIRATORY INFECTION): ICD-10-CM

## 2020-03-09 DIAGNOSIS — R68.89 FLU-LIKE SYMPTOMS: Primary | ICD-10-CM

## 2020-03-09 LAB
FLUAV + FLUBV RNA SPEC NAA+PROBE: NEGATIVE
FLUAV + FLUBV RNA SPEC NAA+PROBE: NEGATIVE
FLUAV + FLUBV RNA SPEC NAA+PROBE: POSITIVE

## 2020-03-09 PROCEDURE — 99214 OFFICE O/P EST MOD 30 MIN: CPT | Performed by: INTERNAL MEDICINE

## 2020-03-09 RX ORDER — BENZONATATE 200 MG/1
200 CAPSULE ORAL 3 TIMES DAILY PRN
Qty: 30 CAPSULE | Refills: 0 | Status: SHIPPED | OUTPATIENT
Start: 2020-03-09 | End: 2020-09-21 | Stop reason: ALTCHOICE

## 2020-03-09 RX ORDER — BENZONATATE 100 MG/1
100 CAPSULE ORAL 3 TIMES DAILY PRN
Qty: 30 CAPSULE | Refills: 0 | Status: SHIPPED | OUTPATIENT
Start: 2020-03-09 | End: 2020-09-21 | Stop reason: ALTCHOICE

## 2020-03-09 RX ORDER — FLUTICASONE PROPIONATE 50 MCG
2 SPRAY, SUSPENSION (ML) NASAL DAILY
Qty: 1 INHALER | Refills: 0 | Status: SHIPPED | OUTPATIENT
Start: 2020-03-09 | End: 2021-11-01

## 2020-03-09 NOTE — TELEPHONE ENCOUNTER
Spoke with pt and verified . Advised to results and recommendations.   Pt verb understanding and agrees to plan

## 2020-03-09 NOTE — PATIENT INSTRUCTIONS
Strep pharyngitis -she is currently on treatment advised her to continue with current medication advised to drink more fluid , salt gargles can help  urti -could be viral on top of strep pharyngitis we will check flu swab stat advised to drink more fluids,

## 2020-03-09 NOTE — PROGRESS NOTES
HPI:    Patient ID: Adrian Amos is a 45year old female. HPI She is here today for follow up   According to her symptoms started last week she went in our walk-in clinic she was diagnosed with strep throat she is currently on amoxicillin. She continu Outpatient Medications   Medication Sig Dispense Refill   • benzonatate 200 MG Oral Cap Take 1 capsule (200 mg total) by mouth 3 (three) times daily as needed.  30 capsule 0   • Fluticasone Propionate 50 MCG/ACT Nasal Suspension 2 sprays by Each Nare route Constitutional: She is oriented to person, place, and time. She appears well-developed and well-nourished. No distress. HENT:   Head: Normocephalic and atraumatic. Right Ear: Tympanic membrane and external ear normal. No drainage or tenderness.  No ma oriented to person, place, and time. She has normal reflexes. No cranial nerve deficit. She exhibits normal muscle tone. Coordination normal.   Skin: Skin is warm, dry and intact. No rash noted. No cyanosis or erythema. Nails show no clubbing.    Psychiatri

## 2020-03-09 NOTE — PROGRESS NOTES
Luciana Felty is a 45year old female. HPI:   See answers to questions above.      Current Outpatient Medications   Medication Sig Dispense Refill   • Ammonium Lactate 12 % External Cream      • amoxicillin 875 MG Oral Tab Take 1 tablet (875 mg total) by

## 2020-06-22 ENCOUNTER — OFFICE VISIT (OUTPATIENT)
Dept: AUDIOLOGY | Facility: CLINIC | Age: 45
End: 2020-06-22
Payer: COMMERCIAL

## 2020-06-22 DIAGNOSIS — H93.13 TINNITUS OF BOTH EARS: Primary | ICD-10-CM

## 2020-06-22 PROCEDURE — 92556 SPEECH AUDIOMETRY COMPLETE: CPT | Performed by: AUDIOLOGIST

## 2020-06-22 PROCEDURE — 92567 TYMPANOMETRY: CPT | Performed by: AUDIOLOGIST

## 2020-06-22 PROCEDURE — 92552 PURE TONE AUDIOMETRY AIR: CPT | Performed by: AUDIOLOGIST

## 2020-06-22 NOTE — PROGRESS NOTES
AUDIOLOGY REPORT      Arik Hernandez is a 45year old female     Referring Provider: Self  YOB: 1975  Medical Record: [id number]      Patient Hearing History:  Patient reports gradual difficulty hearing in the presence of background noise.

## 2020-08-03 ENCOUNTER — LAB ENCOUNTER (OUTPATIENT)
Dept: LAB | Facility: HOSPITAL | Age: 45
End: 2020-08-03
Attending: OTOLARYNGOLOGY
Payer: COMMERCIAL

## 2020-08-03 ENCOUNTER — OFFICE VISIT (OUTPATIENT)
Dept: OTOLARYNGOLOGY | Facility: CLINIC | Age: 45
End: 2020-08-03
Payer: COMMERCIAL

## 2020-08-03 ENCOUNTER — TELEPHONE (OUTPATIENT)
Dept: ALLERGY | Facility: CLINIC | Age: 45
End: 2020-08-03

## 2020-08-03 VITALS
WEIGHT: 165 LBS | TEMPERATURE: 98 F | SYSTOLIC BLOOD PRESSURE: 101 MMHG | HEIGHT: 64 IN | DIASTOLIC BLOOD PRESSURE: 66 MMHG | BODY MASS INDEX: 28.17 KG/M2

## 2020-08-03 DIAGNOSIS — Z91.09 ENVIRONMENTAL ALLERGIES: ICD-10-CM

## 2020-08-03 DIAGNOSIS — R53.83 FATIGUE, UNSPECIFIED TYPE: Primary | ICD-10-CM

## 2020-08-03 DIAGNOSIS — J01.40 SUBACUTE PANSINUSITIS: ICD-10-CM

## 2020-08-03 DIAGNOSIS — R53.83 FATIGUE, UNSPECIFIED TYPE: ICD-10-CM

## 2020-08-03 LAB — TSI SER-ACNC: 1.39 MIU/ML (ref 0.36–3.74)

## 2020-08-03 PROCEDURE — 82785 ASSAY OF IGE: CPT

## 2020-08-03 PROCEDURE — 3078F DIAST BP <80 MM HG: CPT | Performed by: OTOLARYNGOLOGY

## 2020-08-03 PROCEDURE — 3008F BODY MASS INDEX DOCD: CPT | Performed by: OTOLARYNGOLOGY

## 2020-08-03 PROCEDURE — 86003 ALLG SPEC IGE CRUDE XTRC EA: CPT

## 2020-08-03 PROCEDURE — 3074F SYST BP LT 130 MM HG: CPT | Performed by: OTOLARYNGOLOGY

## 2020-08-03 PROCEDURE — 99213 OFFICE O/P EST LOW 20 MIN: CPT | Performed by: OTOLARYNGOLOGY

## 2020-08-03 PROCEDURE — 36415 COLL VENOUS BLD VENIPUNCTURE: CPT

## 2020-08-03 PROCEDURE — 84443 ASSAY THYROID STIM HORMONE: CPT

## 2020-08-03 RX ORDER — LEVOCETIRIZINE DIHYDROCHLORIDE 5 MG/1
5 TABLET, FILM COATED ORAL EVERY EVENING
Qty: 30 TABLET | Refills: 3 | Status: SHIPPED | OUTPATIENT
Start: 2020-08-03 | End: 2020-11-02

## 2020-08-03 RX ORDER — LEVOCETIRIZINE DIHYDROCHLORIDE 5 MG/1
5 TABLET, FILM COATED ORAL EVERY EVENING
Qty: 30 TABLET | Refills: 11 | Status: SHIPPED | OUTPATIENT
Start: 2020-08-03

## 2020-08-03 RX ORDER — FLUTICASONE PROPIONATE 50 MCG
2 SPRAY, SUSPENSION (ML) NASAL DAILY
Qty: 1 BOTTLE | Refills: 3 | Status: SHIPPED | OUTPATIENT
Start: 2020-08-03 | End: 2020-11-02

## 2020-08-03 RX ORDER — AZELASTINE HCL 205.5 UG/1
2 SPRAY NASAL DAILY
Qty: 1 EACH | Refills: 11 | Status: SHIPPED | OUTPATIENT
Start: 2020-08-03 | End: 2020-09-21 | Stop reason: ALTCHOICE

## 2020-08-03 RX ORDER — MONTELUKAST SODIUM 10 MG/1
10 TABLET ORAL NIGHTLY
Qty: 30 TABLET | Refills: 3 | Status: SHIPPED | OUTPATIENT
Start: 2020-08-03 | End: 2020-09-21 | Stop reason: ALTCHOICE

## 2020-08-03 RX ORDER — MONTELUKAST SODIUM 10 MG/1
10 TABLET ORAL NIGHTLY
Qty: 30 TABLET | Refills: 11 | Status: SHIPPED | OUTPATIENT
Start: 2020-08-03 | End: 2020-09-21 | Stop reason: ALTCHOICE

## 2020-08-03 NOTE — TELEPHONE ENCOUNTER
FYI: Patient has an appointment on 08/13/2020 and told her about the Antihistamine but she states that she can not promise.

## 2020-08-03 NOTE — PROGRESS NOTES
Ike Singh is a 39year old female.   Patient presents with:  Sinus Problem: sinus congestion,post nasal drip for a while  Snoring      HISTORY OF PRESENT ILLNESS   8/3/2020    The patient presents with incredible amounts of fatigue nasal congestion po irregular heartbeat/palpitations and syncope. GI Negative Abdominal pain and diarrhea. Endocrine Negative Cold intolerance and heat intolerance. Neuro Negative Tremors. Psych Negative Anxiety and depression.    Integumentary Negative Frequent skin i tablet, Rfl: 11  •  Fluticasone Propionate 50 MCG/ACT Nasal Suspension, 2 sprays by Nasal route daily. , Disp: 1 Bottle, Rfl: 3  •  Montelukast Sodium 10 MG Oral Tab, Take 1 tablet (10 mg total) by mouth nightly., Disp: 30 tablet, Rfl: 3  •  Levocetirizine unspecified type  Loud hypothyroidism.   Suspect that her underlying allergies is causing quite a bit of symptoms should this be minimal I would then proceed with a sleep study to rule out sleep apnea  - TSH W REFLEX TO FREE T4; Future  - ADULT FOOD ALLERGY

## 2020-08-06 LAB
A ALTERNATA IGE QN: <0.1 KUA/L (ref ?–0.1)
A FUMIGATUS IGE QN: <0.1 KUA/L (ref ?–0.1)
AMER SYCAMORE IGE QN: <0.1 KUA/L (ref ?–0.1)
BERMUDA GRASS IGE QN: 0.22 KUA/L (ref ?–0.1)
BOXELDER IGE QN: 0.2 KUA/L (ref ?–0.1)
C HERBARUM IGE QN: <0.1 KUA/L (ref ?–0.1)
CALIF WALNUT IGE QN: <0.1 KUA/L (ref ?–0.1)
CAT DANDER IGE QN: 0.87 KUA/L (ref ?–0.1)
CLAM IGE QN: <0.1 KUA/L (ref ?–0.1)
CMN PIGWEED IGE QN: <0.1 KUA/L (ref ?–0.1)
CODFISH IGE QN: <0.1 KUA/L (ref ?–0.1)
COMMON RAGWEED IGE QN: 0.27 KUA/L (ref ?–0.1)
CORN IGE QN: <0.1 KUA/L (ref ?–0.1)
COTTONWOOD IGE QN: <0.1 KUA/L (ref ?–0.1)
COW MILK IGE QN: <0.1 KUA/L (ref ?–0.1)
D FARINAE IGE QN: <0.1 KUA/L (ref ?–0.1)
D PTERONYSS IGE QN: <0.1 KUA/L (ref ?–0.1)
DOG DANDER IGE QN: 0.11 KUA/L (ref ?–0.1)
EGG WHITE IGE QN: <0.1 KUA/L (ref ?–0.1)
GOOSEFOOT IGE QN: <0.1 KUA/L (ref ?–0.1)
HOUSE DUST HS IGE QN: 0.2 KUA/L (ref ?–0.1)
IGE SERPL-ACNC: 11.6 KU/L (ref 2–214)
IGE SERPL-ACNC: 12 KU/L (ref 2–214)
KENT BLUE GRASS IGE QN: 1.04 KUA/L (ref ?–0.1)
M RACEMOSUS IGE QN: <0.1 KUA/L (ref ?–0.1)
MARSH ELDER IGE QN: <0.1 KUA/L (ref ?–0.1)
MOUSE EPITH IGE QN: <0.1 KUA/L (ref ?–0.1)
MT JUNIPER IGE QN: <0.1 KUA/L (ref ?–0.1)
P NOTATUM IGE QN: <0.1 KUA/L (ref ?–0.1)
PEANUT IGE QN: <0.1 KUA/L (ref ?–0.1)
PECAN/HICK TREE IGE QN: <0.1 KUA/L (ref ?–0.1)
PER RYE GRASS IGE QN: 0.73 KUA/L (ref ?–0.1)
ROACH IGE QN: <0.1 KUA/L (ref ?–0.1)
SALTWORT IGE QN: <0.1 KUA/L (ref ?–0.1)
SCALLOP IGE QN: <0.1 KUA/L (ref ?–0.1)
SESAME SEED IGE QN: <0.1 KUA/L (ref ?–0.1)
SHRIMP IGE QN: <0.1 KUA/L (ref ?–0.1)
SOYBEAN IGE QN: <0.1 KUA/L (ref ?–0.1)
TIMOTHY IGE QN: 0.94 KUA/L (ref ?–0.1)
WALNUT IGE QN: <0.1 KUA/L (ref ?–0.1)
WHEAT IGE QN: <0.1 KUA/L (ref ?–0.1)
WHITE ASH IGE QN: <0.1 KUA/L (ref ?–0.1)
WHITE ELM IGE QN: <0.1 KUA/L (ref ?–0.1)
WHITE MULBERRY IGE QN: <0.1 KUA/L (ref ?–0.1)
WHITE OAK IGE QN: <0.1 KUA/L (ref ?–0.1)

## 2020-08-07 ENCOUNTER — HOSPITAL ENCOUNTER (OUTPATIENT)
Dept: CT IMAGING | Facility: HOSPITAL | Age: 45
Discharge: HOME | End: 2020-08-07
Attending: OTOLARYNGOLOGY
Payer: COMMERCIAL

## 2020-08-07 DIAGNOSIS — J01.40 SUBACUTE PANSINUSITIS: ICD-10-CM

## 2020-08-07 PROCEDURE — 70486 CT MAXILLOFACIAL W/O DYE: CPT | Performed by: OTOLARYNGOLOGY

## 2020-08-08 ENCOUNTER — TELEPHONE (OUTPATIENT)
Dept: OTOLARYNGOLOGY | Facility: CLINIC | Age: 45
End: 2020-08-08

## 2020-08-13 ENCOUNTER — NURSE ONLY (OUTPATIENT)
Dept: ALLERGY | Facility: CLINIC | Age: 45
End: 2020-08-13
Payer: COMMERCIAL

## 2020-08-13 ENCOUNTER — OFFICE VISIT (OUTPATIENT)
Dept: ALLERGY | Facility: CLINIC | Age: 45
End: 2020-08-13
Payer: COMMERCIAL

## 2020-08-13 VITALS
HEIGHT: 64 IN | TEMPERATURE: 99 F | SYSTOLIC BLOOD PRESSURE: 114 MMHG | WEIGHT: 169 LBS | OXYGEN SATURATION: 96 % | RESPIRATION RATE: 20 BRPM | HEART RATE: 85 BPM | BODY MASS INDEX: 28.85 KG/M2 | DIASTOLIC BLOOD PRESSURE: 75 MMHG

## 2020-08-13 DIAGNOSIS — J30.89 PERENNIAL ALLERGIC RHINITIS WITH SEASONAL VARIATION: Primary | ICD-10-CM

## 2020-08-13 DIAGNOSIS — J34.2 NASAL SEPTAL DEVIATION: ICD-10-CM

## 2020-08-13 DIAGNOSIS — J30.2 PERENNIAL ALLERGIC RHINITIS WITH SEASONAL VARIATION: Primary | ICD-10-CM

## 2020-08-13 DIAGNOSIS — J30.89 ENVIRONMENTAL AND SEASONAL ALLERGIES: ICD-10-CM

## 2020-08-13 PROCEDURE — 3078F DIAST BP <80 MM HG: CPT | Performed by: ALLERGY & IMMUNOLOGY

## 2020-08-13 PROCEDURE — 95024 IQ TESTS W/ALLERGENIC XTRCS: CPT | Performed by: ALLERGY & IMMUNOLOGY

## 2020-08-13 PROCEDURE — 99244 OFF/OP CNSLTJ NEW/EST MOD 40: CPT | Performed by: ALLERGY & IMMUNOLOGY

## 2020-08-13 PROCEDURE — 3008F BODY MASS INDEX DOCD: CPT | Performed by: ALLERGY & IMMUNOLOGY

## 2020-08-13 PROCEDURE — 95004 PERQ TESTS W/ALRGNC XTRCS: CPT | Performed by: ALLERGY & IMMUNOLOGY

## 2020-08-13 PROCEDURE — 3074F SYST BP LT 130 MM HG: CPT | Performed by: ALLERGY & IMMUNOLOGY

## 2020-08-13 NOTE — PROGRESS NOTES
Simone is a 39year old female. HPI:   Patient presents with: Allergies: Consult, new pt. Pt presents with concern of seasonal, environmental allergies due to hx of chronic sinusitis, nasal congestion, headaches.      Patient is a 82-year-old f Mild adenoid enlargement.         Remote - PS     Dictated by (CST): Nash Lee MD on 8/07/2020 at 8:57 AM       Finalized by (CST): Nash Lee MD on 8/07/2020 at 9:03 AM                HISTORY:  Past Medical History:   Diagnosis Date   • Chronic taking: Reported on 8/13/2020 ) 30 tablet 11   • Montelukast Sodium 10 MG Oral Tab Take 1 tablet (10 mg total) by mouth nightly.  (Patient not taking: Reported on 8/13/2020 ) 30 tablet 3   • Levocetirizine Dihydrochloride (XYZAL) 5 MG Oral Tab Take 1 tablet Negative for dysuria and hematuria  Hema/Lymph:  Negative for easy bleeding and easy bruising  Integumentary:  Negative for pruritus and rash  Musculoskeletal:  Negative for joint symptoms  Neurological:  Negative for dizziness, seizures  Psychiatric:  Neg sinus headaches      Spt today to aeroallergens was positive to trees,  grass ragweed weeds  cat dog and cockroach, feather    Pt with + response to histamine control     Recs:   Handouts on allergies and avoidance measures provided and reviewed including

## 2020-08-13 NOTE — PATIENT INSTRUCTIONS
Recs:   Handouts on allergies and avoidance measures provided and reviewed including the potential treatment option of immunotherapy. Treatment plan reviewed.    Continue with Flonase Mucinex with Sudafed as needed  Trial of Xyzal, levocetirizine 5 mg once

## 2020-08-24 ENCOUNTER — OFFICE VISIT (OUTPATIENT)
Dept: DERMATOLOGY CLINIC | Facility: CLINIC | Age: 45
End: 2020-08-24
Payer: COMMERCIAL

## 2020-08-24 DIAGNOSIS — L30.8 PSORIASIFORM DERMATITIS: Primary | ICD-10-CM

## 2020-08-24 DIAGNOSIS — L30.1 DYSHIDROSIS (POMPHOLYX): ICD-10-CM

## 2020-08-24 PROCEDURE — 99213 OFFICE O/P EST LOW 20 MIN: CPT | Performed by: DERMATOLOGY

## 2020-08-30 NOTE — PROGRESS NOTES
Tran Adams is a 39year old female. 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 . Same issue.  Pt's issue resolved while applying fluocinon (XYZAL) 5 MG Oral Tab Take 1 tablet (5 mg total) by mouth every evening. 30 tablet 3   • benzonatate 200 MG Oral Cap Take 1 capsule (200 mg total) by mouth 3 (three) times daily as needed.  30 capsule 0   • Fluticasone Propionate 50 MCG/ACT Nasal Suspension file    Social Needs      Financial resource strain: Not on file      Food insecurity:        Worry: Not on file        Inability: Not on file      Transportation needs:        Medical: Not on file        Non-medical: Not on file    Tobacco Use      Malikin mychart when flaring began 1 month ago. No personal family history skin cancer. Recent biopsy of blue nevus right medial foot foot benign. No atypical features.       Patient with chronic dyshidrotic-like changes over the hands feet with fissuring Multiple light to medium brown, well marginated, uniformly pigmented, macules and papules 6 mm and less scattered on exam. pigmented lesions examined with dermoscopy benign-appearing patterns.      Waxy tannish keratotic papules scattered, cherry-red vasc evidence of second infection discussed possible oral steroids. Will increase strength topicals are to halobetasol may occlude this. May use 3-4 times daily especially in hands.   Fissuring scaling surrounding this is likely aggravating as this does get ha indicates understanding of these issues and agrees to the plan. The patient is asked to return as noted in follow-up/ above. This note was generated using Dragon voice recognition software.   Please contact me regarding any confusion resulting from erro

## 2020-09-21 ENCOUNTER — OFFICE VISIT (OUTPATIENT)
Dept: NEUROLOGY | Facility: CLINIC | Age: 45
End: 2020-09-21
Payer: COMMERCIAL

## 2020-09-21 ENCOUNTER — HOSPITAL ENCOUNTER (OUTPATIENT)
Dept: GENERAL RADIOLOGY | Facility: HOSPITAL | Age: 45
Discharge: HOME | End: 2020-09-21
Attending: PHYSICAL MEDICINE & REHABILITATION
Payer: COMMERCIAL

## 2020-09-21 VITALS — BODY MASS INDEX: 27.49 KG/M2 | WEIGHT: 161 LBS | HEIGHT: 64 IN

## 2020-09-21 DIAGNOSIS — R29.3 POOR POSTURE: ICD-10-CM

## 2020-09-21 DIAGNOSIS — M54.9 ACUTE MID BACK PAIN: ICD-10-CM

## 2020-09-21 DIAGNOSIS — M54.9 ACUTE MID BACK PAIN: Primary | ICD-10-CM

## 2020-09-21 DIAGNOSIS — G47.9 SLEEP DISTURBANCE: ICD-10-CM

## 2020-09-21 PROCEDURE — 99204 OFFICE O/P NEW MOD 45 MIN: CPT | Performed by: PHYSICAL MEDICINE & REHABILITATION

## 2020-09-21 PROCEDURE — 3008F BODY MASS INDEX DOCD: CPT | Performed by: PHYSICAL MEDICINE & REHABILITATION

## 2020-09-21 PROCEDURE — 72114 X-RAY EXAM L-S SPINE BENDING: CPT | Performed by: PHYSICAL MEDICINE & REHABILITATION

## 2020-09-21 NOTE — PROGRESS NOTES
[address]  NEW PATIENT EVALUATION    Chief Complaint: back pain.     HISTORY OF PRESENT ILLNESS:   Patient presents with:  Back Pain: Patient present with mid/lowback aching pain that is there constant but l Millicent Godoy MD   • OTHER SURGICAL HISTORY     • SINUS SURGERY    2016         CURRENT MEDICATIONS:     Current Outpatient Medications   Medication Sig Dispense Refill   • Halobetasol Propionate 0.05 % External Cream Apply thin layer to affected are denies  Chills: denies  Fever: denies  Weight Gain: denies  Weight Loss: denies   Cardiovascular  Chest Pain: denies  Irregular Heartbeat: denies   Respiratory  Painful Breathing: denies  Wheezing: denies   Gastrointestinal  Bowel Incontinence: denies  Hea Tenderness to palpation of the bilateral thoracic and lower lumbar paraspinals  ROM: FAROM  Strength: 5/5 in bilateral lower extremities  Sensation: Intact to light touch in all dermatomes of the lower extremities  Reflexes: 2/4 at L4 and S1  Facet Loading restart a dedicated PT course and home exercises as well as continue using ice and heat and occasional NSAID medication as needed and topical lidocaine patches as well.   I have recommend that she follow-up with me in 4 weeks if she does not have sufficient

## 2020-09-23 ENCOUNTER — TELEPHONE (OUTPATIENT)
Dept: PHYSICAL THERAPY | Age: 45
End: 2020-09-23

## 2020-09-25 ENCOUNTER — TELEPHONE (OUTPATIENT)
Dept: PHYSICAL THERAPY | Facility: HOSPITAL | Age: 45
End: 2020-09-25

## 2020-09-28 ENCOUNTER — TELEPHONE (OUTPATIENT)
Dept: NEUROLOGY | Facility: CLINIC | Age: 45
End: 2020-09-28

## 2020-09-28 ENCOUNTER — OFFICE VISIT (OUTPATIENT)
Dept: PHYSICAL THERAPY | Age: 45
End: 2020-09-28
Attending: PHYSICAL MEDICINE & REHABILITATION
Payer: COMMERCIAL

## 2020-09-28 DIAGNOSIS — M54.9 ACUTE MID BACK PAIN: ICD-10-CM

## 2020-09-28 PROCEDURE — 97530 THERAPEUTIC ACTIVITIES: CPT

## 2020-09-28 PROCEDURE — 97110 THERAPEUTIC EXERCISES: CPT

## 2020-09-28 PROCEDURE — 97162 PT EVAL MOD COMPLEX 30 MIN: CPT

## 2020-09-28 NOTE — PROGRESS NOTES
SPINE EVALUATION:   Referring Physician: Dr. Deborah Peck  Diagnosis:    Acute mid back pain (M54.9)  Date of Service: 9/28/2020     PATIENT SUMMARY   France Jay is a 39year old female who presents to therapy today with complaints of mid-low back pain that measures show decreased postural awareness and strength; decreased trunk ROM; and decreased B hip extension/abduction.   Functional deficits include but are not limited to difficulty with holding her foster 2- y/o daughter; bending to give her daughter a ba corrections, ergonomics and importance of remaining active  Patient was instructed in and issued a HEP for: repeated trunk ext in sitting; pelvic tilts; BKFOs    Charges: PT Eval Moderate Complexity, 1 TA, 1 TE      Total Timed Treatment: 25 min     Total PT    [de-identified] certification required: Yes  I certify the need for these services furnished under this plan of treatment and while under my care.     X___________________________________________________ Date____________________    Certification From: 9/2

## 2020-09-30 ENCOUNTER — APPOINTMENT (OUTPATIENT)
Dept: PHYSICAL THERAPY | Age: 45
End: 2020-09-30
Attending: PHYSICAL MEDICINE & REHABILITATION
Payer: COMMERCIAL

## 2020-09-30 PROCEDURE — 97110 THERAPEUTIC EXERCISES: CPT

## 2020-09-30 NOTE — PROGRESS NOTES
Dx:     Acute mid back pain (M54.9)      Insurance (Authorized # of Visits):  [facility] Physician: Dr. Kemal Parisi  Next MD visit: none scheduled  Fall Risk: standard         Precautions: n/a             Subjective: pt reports that she is fee

## 2020-10-05 ENCOUNTER — APPOINTMENT (OUTPATIENT)
Dept: PHYSICAL THERAPY | Age: 45
End: 2020-10-05
Attending: PHYSICAL MEDICINE & REHABILITATION
Payer: COMMERCIAL

## 2020-10-07 ENCOUNTER — OFFICE VISIT (OUTPATIENT)
Dept: PHYSICAL THERAPY | Age: 45
End: 2020-10-07
Attending: PHYSICAL MEDICINE & REHABILITATION
Payer: COMMERCIAL

## 2020-10-07 PROCEDURE — 97110 THERAPEUTIC EXERCISES: CPT

## 2020-10-07 NOTE — PROGRESS NOTES
Dx:     Acute mid back pain (M54.9)      Insurance (Authorized # of Visits):  [facility] Physician: Dr. Comfort Baxter  Next MD visit: none scheduled  Fall Risk: standard         Precautions: n/a             Subjective: pt reports that she is bet Therapy  L4/L5 manip Bilateral Manual Therapy  Prone P/A mob at lumbar                      HEP: thoracic ext; pelvic tilt; BKFO; LTR    Charges: 3 TE      Total Timed Treatment: 45 min  Total Treatment Time: 45 min

## 2020-10-12 ENCOUNTER — APPOINTMENT (OUTPATIENT)
Dept: PHYSICAL THERAPY | Age: 45
End: 2020-10-12
Attending: PHYSICAL MEDICINE & REHABILITATION
Payer: COMMERCIAL

## 2020-10-12 ENCOUNTER — APPOINTMENT (OUTPATIENT)
Dept: PHYSICAL THERAPY | Facility: HOSPITAL | Age: 45
End: 2020-10-12
Attending: PHYSICAL MEDICINE & REHABILITATION
Payer: COMMERCIAL

## 2020-10-14 ENCOUNTER — APPOINTMENT (OUTPATIENT)
Dept: PHYSICAL THERAPY | Facility: HOSPITAL | Age: 45
End: 2020-10-14
Attending: PHYSICAL MEDICINE & REHABILITATION
Payer: COMMERCIAL

## 2020-10-14 ENCOUNTER — OFFICE VISIT (OUTPATIENT)
Dept: PHYSICAL THERAPY | Age: 45
End: 2020-10-14
Attending: PHYSICAL MEDICINE & REHABILITATION
Payer: COMMERCIAL

## 2020-10-14 PROCEDURE — 97140 MANUAL THERAPY 1/> REGIONS: CPT

## 2020-10-14 PROCEDURE — 97110 THERAPEUTIC EXERCISES: CPT

## 2020-10-14 NOTE — PROGRESS NOTES
Dx:     Acute mid back pain (M54.9)      Insurance (Authorized # of Visits):  BCBS PPO; 12 visits; cert ends          Authorizing Physician: Dr. Chepe Hummel  Next MD visit: none scheduled  Fall Risk: standard         Precautions: n/a             Subjective: pt r 10x2  Lumbar ext standing 10x  Scapular retraction 10x2  UT shoulder shrugs 10x5\"  Sitting: thoracic ext 10x1 Therapeutic Exercise  Press ups 10x2  Prone glut set 20x5\"  Hip add ball 10x2  LTR 10x2  BKFO 10x2  Pelvic tilt 10x2  Lumbar ext standing 10x  S

## 2020-10-19 ENCOUNTER — APPOINTMENT (OUTPATIENT)
Dept: PHYSICAL THERAPY | Facility: HOSPITAL | Age: 45
End: 2020-10-19
Attending: PHYSICAL MEDICINE & REHABILITATION
Payer: COMMERCIAL

## 2020-10-21 ENCOUNTER — APPOINTMENT (OUTPATIENT)
Dept: PHYSICAL THERAPY | Facility: HOSPITAL | Age: 45
End: 2020-10-21
Attending: PHYSICAL MEDICINE & REHABILITATION
Payer: COMMERCIAL

## 2020-10-21 ENCOUNTER — OFFICE VISIT (OUTPATIENT)
Dept: PHYSICAL THERAPY | Age: 45
End: 2020-10-21
Attending: PHYSICAL MEDICINE & REHABILITATION
Payer: COMMERCIAL

## 2020-10-21 PROCEDURE — 97110 THERAPEUTIC EXERCISES: CPT

## 2020-10-21 PROCEDURE — 97140 MANUAL THERAPY 1/> REGIONS: CPT

## 2020-10-21 NOTE — PROGRESS NOTES
Dx:     Acute mid back pain (M54.9)      Insurance (Authorized # of Visits):  BCBS PPO; 12 visits; cert ends          Authorizing Physician: Dr. Bam Schafer  Next MD visit: none scheduled  Fall Risk: standard         Precautions: n/a             Subjective: pt s Exercise  Press ups 10x2  Prone glut set 20x5\"  Hip add ball 10x2  LTR 10x2  BKFO 10x2  Pelvic tilt 10x2  Lumbar ext standing 10x  Scapular retraction 10x2  UT shoulder shrugs 10x5\"  Sitting: thoracic ext 10x1  Sitting: pelvic tilt 20x Therapeutic Exerci

## 2020-10-26 ENCOUNTER — OFFICE VISIT (OUTPATIENT)
Dept: PHYSICAL THERAPY | Age: 45
End: 2020-10-26
Attending: PHYSICAL MEDICINE & REHABILITATION
Payer: COMMERCIAL

## 2020-10-26 ENCOUNTER — APPOINTMENT (OUTPATIENT)
Dept: PHYSICAL THERAPY | Facility: HOSPITAL | Age: 45
End: 2020-10-26
Attending: PHYSICAL MEDICINE & REHABILITATION
Payer: COMMERCIAL

## 2020-10-26 PROCEDURE — 97110 THERAPEUTIC EXERCISES: CPT

## 2020-10-26 PROCEDURE — 97140 MANUAL THERAPY 1/> REGIONS: CPT

## 2020-10-26 NOTE — PROGRESS NOTES
Dx:     Acute mid back pain (M54.9)      Insurance (Authorized # of Visits):  BCBS PPO; 12 visits; cert ends  94/18/9332        Authorizing Physician: Dr. Kemal Parisi  Next MD visit: none scheduled  Fall Risk: standard         Precautions: n/a             Subjec 5/12 Date: 10/26/2020  Tx#: 6/12   Therapeutic Exercise  Hip add ball 10x2  LTR 10x2  BKFO 10x2  Pelvic tilt 10x2  Lumbar ext standing 10x  Scapular retraction 10x2  UT shoulder shrugs 10x5\"  Sitting: thoracic ext 10x1 Therapeutic Exercise  Press ups 10x2

## 2020-10-28 ENCOUNTER — APPOINTMENT (OUTPATIENT)
Dept: PHYSICAL THERAPY | Facility: HOSPITAL | Age: 45
End: 2020-10-28
Attending: PHYSICAL MEDICINE & REHABILITATION
Payer: COMMERCIAL

## 2020-11-02 ENCOUNTER — HOSPITAL ENCOUNTER (OUTPATIENT)
Dept: GENERAL RADIOLOGY | Facility: HOSPITAL | Age: 45
Discharge: HOME | End: 2020-11-02
Attending: PHYSICAL MEDICINE & REHABILITATION
Payer: COMMERCIAL

## 2020-11-02 ENCOUNTER — OFFICE VISIT (OUTPATIENT)
Dept: NEUROLOGY | Facility: CLINIC | Age: 45
End: 2020-11-02
Payer: COMMERCIAL

## 2020-11-02 DIAGNOSIS — M54.9 ACUTE MID BACK PAIN: ICD-10-CM

## 2020-11-02 DIAGNOSIS — M54.9 ACUTE MID BACK PAIN: Primary | ICD-10-CM

## 2020-11-02 DIAGNOSIS — R29.3 POOR POSTURE: ICD-10-CM

## 2020-11-02 DIAGNOSIS — G47.9 SLEEP DISTURBANCE: ICD-10-CM

## 2020-11-02 PROCEDURE — 72072 X-RAY EXAM THORAC SPINE 3VWS: CPT | Performed by: PHYSICAL MEDICINE & REHABILITATION

## 2020-11-02 PROCEDURE — 99214 OFFICE O/P EST MOD 30 MIN: CPT | Performed by: PHYSICAL MEDICINE & REHABILITATION

## 2020-11-02 RX ORDER — CALCIUM CARBONATE 200(500)MG
1 TABLET,CHEWABLE ORAL AS NEEDED
COMMUNITY

## 2020-11-02 RX ORDER — AZELASTINE HCL 205.5 UG/1
SPRAY NASAL AS NEEDED
COMMUNITY
Start: 2020-10-27

## 2020-11-02 RX ORDER — MELOXICAM 15 MG/1
15 TABLET ORAL DAILY
Qty: 14 TABLET | Refills: 0 | Status: SHIPPED | OUTPATIENT
Start: 2020-11-02 | End: 2020-11-16

## 2020-11-02 NOTE — PROGRESS NOTES
[address]  FOLLOW UP EVALUATION    Chief Complaint: back pain.     HISTORY OF PRESENT ILLNESS:   Patient presents with:  Back Pain: pt here for follow up with c/o right side thoracic back pain that increases as ibuprofen and topical creams without any significant improvement. She notices that with stretching her pain can improve, her pain is worse with laying on her stomach as well as with repetitive bending forward activities.   She is a psychologist and work SHORTNESS OF BREATH  Pollen Extract          UNKNOWN      FAMILY HISTORY:     Family History   Problem Relation Age of Onset   • Diabetes Father    • Macular degeneration Mother    • Diabetes Sister    • Cataracts Other    • Hypertension Other    • Br paraspinals  ROM: FAROM  Strength: 5/5 in bilateral lower extremities  Sensation: Intact to light touch in all dermatomes of the lower extremities  Reflexes: 2/4 at L4 and S1  Facet Loading: no specific facet pain  Straight leg raise: negative for radicula recommended she follow-up in 4 weeks at which time we can consider trigger point injection if patient continues to experience discomfort. The patient verbalized understanding with the plan and was in agreement.  All questions/concerns were addressed and

## 2020-11-02 NOTE — PATIENT INSTRUCTIONS
-Continue PT and home exercises  -Start Mobic daily with food  -Follow up in 4 weeks  -If no better, will discuss further treatment  -Complete Xray of the thoracic spine

## 2020-11-09 ENCOUNTER — OFFICE VISIT (OUTPATIENT)
Dept: PHYSICAL THERAPY | Age: 45
End: 2020-11-09
Attending: PHYSICAL MEDICINE & REHABILITATION
Payer: COMMERCIAL

## 2020-11-09 PROCEDURE — 97110 THERAPEUTIC EXERCISES: CPT

## 2020-11-09 PROCEDURE — 97140 MANUAL THERAPY 1/> REGIONS: CPT

## 2020-11-09 NOTE — PROGRESS NOTES
Dx:     Acute mid back pain (M54.9)      Insurance (Authorized # of Visits):  BCBS PPO; 12 visits; cert ends  73/33/2310        Authorizing Physician: Dr. Bronwyn Price  Next MD visit: none scheduled  Fall Risk: standard         Precautions: n/a             Subjec Pt with spasm present at L MT/rhomboids with c/o TTP. Following manual therapy, pt reported decreased L UE pain. She was able to further reduce her L UE symptoms following thoracic stretches. Goals: (to be met in 12 visits)   1.  Patient will be indepe mid-thoracic back stretch 20\"x3  Sitting: thoracic ext with hands behind head 10x1  Sitting: pelvic tilt 10x1  Standing: lateral trunk stretch 10x   Manual Therapy  Prone P/A mob at lumbar   Manual Therapy  Prone P/A mob at lumbar grades II-III  Prone: MF

## 2020-11-20 ENCOUNTER — APPOINTMENT (OUTPATIENT)
Dept: PHYSICAL THERAPY | Age: 45
End: 2020-11-20
Attending: PHYSICAL MEDICINE & REHABILITATION
Payer: COMMERCIAL

## 2020-11-23 ENCOUNTER — APPOINTMENT (OUTPATIENT)
Dept: PHYSICAL THERAPY | Age: 45
End: 2020-11-23
Attending: PHYSICAL MEDICINE & REHABILITATION
Payer: COMMERCIAL

## 2020-12-02 ENCOUNTER — OFFICE VISIT (OUTPATIENT)
Dept: PHYSICAL THERAPY | Age: 45
End: 2020-12-02
Attending: PHYSICAL MEDICINE & REHABILITATION
Payer: COMMERCIAL

## 2020-12-02 ENCOUNTER — TELEPHONE (OUTPATIENT)
Dept: INTERNAL MEDICINE CLINIC | Facility: CLINIC | Age: 45
End: 2020-12-02

## 2020-12-02 PROCEDURE — 97110 THERAPEUTIC EXERCISES: CPT

## 2020-12-02 PROCEDURE — 97140 MANUAL THERAPY 1/> REGIONS: CPT

## 2020-12-02 NOTE — TELEPHONE ENCOUNTER
Patient walked in to the [facility] office to update her chart she states she had her flu shot on 9-15-20 at [facility] in [address] [place] [address] exp. 6-30-21 manufacture is [employer]

## 2020-12-06 NOTE — PROGRESS NOTES
Dx:     Acute mid back pain (M54.9)      Insurance (Authorized # of Visits):  BCBS PPO; 12 visits; cert ends  42/95/2016        Authorizing Physician: Dr. Adia Craft  Next MD visit: none scheduled  Fall Risk: standard         Precautions: n/a             Subjec ext in sitting/lumbar ext in standing when she is at work. Goals: (to be met in 12 visits)   1. Patient will be independent with HEP to optimize gains made in PT to home and community settings and for self management of prophylactic care.  (In Jose Meza UT/MT/rhomboids x 6 min  Prone: P/A mob at lumbar/thoracic spine grade II-IV Manual therapy  Prone: MFR at L MT/rhomboids x 8 min  Prone: P/A mob at lumbar/thoracic spine grade II-IV Manual therapy  Prone: MFR at L MT/rhomboids x 8 min  Prone: P/A mob at l

## 2020-12-09 ENCOUNTER — OFFICE VISIT (OUTPATIENT)
Dept: PHYSICAL THERAPY | Age: 45
End: 2020-12-09
Attending: PHYSICAL MEDICINE & REHABILITATION
Payer: COMMERCIAL

## 2020-12-09 PROCEDURE — 97140 MANUAL THERAPY 1/> REGIONS: CPT

## 2020-12-09 PROCEDURE — 97110 THERAPEUTIC EXERCISES: CPT

## 2020-12-09 NOTE — PROGRESS NOTES
Dx:     Acute mid back pain (M54.9)      Insurance (Authorized # of Visits):  BCBS PPO; 12 visits; cert ends  78/95/5986        Authorizing Physician: Dr. Karyna Mckeon  Next MD visit: none scheduled  Fall Risk: standard         Precautions: n/a              Disch She admits that despite having intermittent pain, patient reports independence with her ability to manage her symptoms. All PT goals have been met at this time. Goals: (to be met in 12 visits)   1.  Patient will be independent with HEP to optimize gain 10x2  Sitting: pelvic tilt 10x1  Standing: lumbar ext 10x   Manual therapy  Prone: MFR at L MT/rhomboids x 8 min  Prone: P/A mob at lumbar/thoracic spine grade II-IV Manual therapy  Prone: MFR at L MT/rhomboids x 8 min  Prone: P/A mob at lumbar/thoracic sp

## 2020-12-16 ENCOUNTER — APPOINTMENT (OUTPATIENT)
Dept: PHYSICAL THERAPY | Age: 45
End: 2020-12-16
Attending: PHYSICAL MEDICINE & REHABILITATION
Payer: COMMERCIAL

## 2021-02-16 ENCOUNTER — TELEPHONE (OUTPATIENT)
Dept: INTERNAL MEDICINE CLINIC | Facility: CLINIC | Age: 46
End: 2021-02-16

## 2021-02-16 ENCOUNTER — TELEMEDICINE (OUTPATIENT)
Dept: INTERNAL MEDICINE CLINIC | Facility: CLINIC | Age: 46
End: 2021-02-16
Payer: COMMERCIAL

## 2021-02-16 DIAGNOSIS — K52.9 GASTROENTERITIS: Primary | ICD-10-CM

## 2021-02-16 DIAGNOSIS — Z12.31 ENCOUNTER FOR SCREENING MAMMOGRAM FOR MALIGNANT NEOPLASM OF BREAST: ICD-10-CM

## 2021-02-16 PROCEDURE — 99213 OFFICE O/P EST LOW 20 MIN: CPT | Performed by: INTERNAL MEDICINE

## 2021-02-16 NOTE — PROGRESS NOTES
This is a telemedicine visit with live, interactive video and audio. Patient understands and accepts financial responsibility for any deductible, co-insurance and/or co-pays associated with this service.     SUBJECTIVE  She is scheduled video visit toda Halobetasol Propionate 0.05 % External Cream Apply thin layer to affected area(s). 30 g 2   • Levocetirizine Dihydrochloride (XYZAL ALLERGY 24HR) 5 MG Oral Tab Take 1 tablet (5 mg total) by mouth every evening.  (Patient taking differently: Take 5 mg by curt

## 2021-02-16 NOTE — TELEPHONE ENCOUNTER
Patient calling for a note for work. She would like the day missed to be for today, 2/16/21. Okay to send through [address]

## 2021-03-15 ENCOUNTER — OFFICE VISIT (OUTPATIENT)
Dept: INTERNAL MEDICINE CLINIC | Facility: CLINIC | Age: 46
End: 2021-03-15
Payer: COMMERCIAL

## 2021-03-15 VITALS
WEIGHT: 161 LBS | OXYGEN SATURATION: 97 % | HEART RATE: 98 BPM | DIASTOLIC BLOOD PRESSURE: 72 MMHG | SYSTOLIC BLOOD PRESSURE: 113 MMHG | HEIGHT: 64 IN | BODY MASS INDEX: 27.49 KG/M2

## 2021-03-15 DIAGNOSIS — Z00.00 ANNUAL PHYSICAL EXAM: Primary | ICD-10-CM

## 2021-03-15 DIAGNOSIS — Z23 NEED FOR VACCINATION: ICD-10-CM

## 2021-03-15 LAB
ALBUMIN SERPL-MCNC: 3.6 G/DL (ref 3.4–5)
ALBUMIN/GLOB SERPL: 0.9 {RATIO} (ref 1–2)
ALP LIVER SERPL-CCNC: 58 U/L
ALT SERPL-CCNC: 21 U/L
ANION GAP SERPL CALC-SCNC: 6 MMOL/L (ref 0–18)
AST SERPL-CCNC: 15 U/L (ref 15–37)
BASOPHILS # BLD AUTO: 0.02 X10(3) UL (ref 0–0.2)
BASOPHILS NFR BLD AUTO: 0.3 %
BILIRUB SERPL-MCNC: 0.5 MG/DL (ref 0.1–2)
BUN BLD-MCNC: 7 MG/DL (ref 7–18)
BUN/CREAT SERPL: 10.3 (ref 10–20)
CALCIUM BLD-MCNC: 8.5 MG/DL (ref 8.5–10.1)
CHLORIDE SERPL-SCNC: 107 MMOL/L (ref 98–112)
CHOLEST SMN-MCNC: 142 MG/DL (ref ?–200)
CO2 SERPL-SCNC: 24 MMOL/L (ref 21–32)
CREAT BLD-MCNC: 0.68 MG/DL
DEPRECATED RDW RBC AUTO: 37.7 FL (ref 35.1–46.3)
EOSINOPHIL # BLD AUTO: 0.1 X10(3) UL (ref 0–0.7)
EOSINOPHIL NFR BLD AUTO: 1.3 %
ERYTHROCYTE [DISTWIDTH] IN BLOOD BY AUTOMATED COUNT: 12.9 % (ref 11–15)
EST. AVERAGE GLUCOSE BLD GHB EST-MCNC: 114 MG/DL (ref 68–126)
GLOBULIN PLAS-MCNC: 4.1 G/DL (ref 2.8–4.4)
GLUCOSE BLD-MCNC: 88 MG/DL (ref 70–99)
HBA1C MFR BLD HPLC: 5.6 % (ref ?–5.7)
HCT VFR BLD AUTO: 42.3 %
HDLC SERPL-MCNC: 43 MG/DL (ref 40–59)
HGB BLD-MCNC: 13.6 G/DL
IMM GRANULOCYTES # BLD AUTO: 0.02 X10(3) UL (ref 0–1)
IMM GRANULOCYTES NFR BLD: 0.3 %
LDLC SERPL CALC-MCNC: 86 MG/DL (ref ?–100)
LYMPHOCYTES # BLD AUTO: 1.37 X10(3) UL (ref 1–4)
LYMPHOCYTES NFR BLD AUTO: 17.1 %
M PROTEIN MFR SERPL ELPH: 7.7 G/DL (ref 6.4–8.2)
MCH RBC QN AUTO: 26.1 PG (ref 26–34)
MCHC RBC AUTO-ENTMCNC: 32.2 G/DL (ref 31–37)
MCV RBC AUTO: 81 FL
MONOCYTES # BLD AUTO: 0.41 X10(3) UL (ref 0.1–1)
MONOCYTES NFR BLD AUTO: 5.1 %
NEUTROPHILS # BLD AUTO: 6.08 X10 (3) UL (ref 1.5–7.7)
NEUTROPHILS # BLD AUTO: 6.08 X10(3) UL (ref 1.5–7.7)
NEUTROPHILS NFR BLD AUTO: 75.9 %
NONHDLC SERPL-MCNC: 99 MG/DL (ref ?–130)
OSMOLALITY SERPL CALC.SUM OF ELEC: 281 MOSM/KG (ref 275–295)
PATIENT FASTING Y/N/NP: YES
PATIENT FASTING Y/N/NP: YES
PLATELET # BLD AUTO: 367 10(3)UL (ref 150–450)
POTASSIUM SERPL-SCNC: 3.6 MMOL/L (ref 3.5–5.1)
RBC # BLD AUTO: 5.22 X10(6)UL
SODIUM SERPL-SCNC: 137 MMOL/L (ref 136–145)
TRIGL SERPL-MCNC: 65 MG/DL (ref 30–149)
TSI SER-ACNC: 0.83 MIU/ML (ref 0.36–3.74)
VLDLC SERPL CALC-MCNC: 13 MG/DL (ref 0–30)
WBC # BLD AUTO: 8 X10(3) UL (ref 4–11)

## 2021-03-15 PROCEDURE — 99396 PREV VISIT EST AGE 40-64: CPT | Performed by: INTERNAL MEDICINE

## 2021-03-15 PROCEDURE — 90715 TDAP VACCINE 7 YRS/> IM: CPT | Performed by: INTERNAL MEDICINE

## 2021-03-15 PROCEDURE — 3078F DIAST BP <80 MM HG: CPT | Performed by: INTERNAL MEDICINE

## 2021-03-15 PROCEDURE — 3008F BODY MASS INDEX DOCD: CPT | Performed by: INTERNAL MEDICINE

## 2021-03-15 PROCEDURE — 36415 COLL VENOUS BLD VENIPUNCTURE: CPT | Performed by: INTERNAL MEDICINE

## 2021-03-15 PROCEDURE — 90471 IMMUNIZATION ADMIN: CPT | Performed by: INTERNAL MEDICINE

## 2021-03-15 PROCEDURE — 3074F SYST BP LT 130 MM HG: CPT | Performed by: INTERNAL MEDICINE

## 2021-03-15 RX ORDER — GUAIFENESIN 600 MG
1200 TABLET, EXTENDED RELEASE 12 HR ORAL 2 TIMES DAILY
COMMUNITY

## 2021-03-15 NOTE — PROGRESS NOTES
HPI:   Tran Adams is a 39year old female who presents for a complete physical exam. Her menstrual period is regular   Wt Readings from Last 3 Encounters:  03/15/21 : 161 lb (73 kg)  09/21/20 : 161 lb (73 kg)  08/13/20 : 169 lb (76.7 kg)    Body mass Surgical History:   Procedure Laterality Date   • LASIK Bilateral Monet Ordoñez MD   • OTHER SURGICAL HISTORY     • SINUS SURGERY    2016      Family History   Problem Relation Age of Onset   • Diabetes Father    • Macular degeneration Mo Well developed.    Eyes Normal Pupil - Right: Normal, Left: Normal.   Ears Normal External - normal. Canal. TM - Normal bilaterally  Hearing - grossly normal   Nasopharynx Normal External nose - Normal. Lips/teeth/gums - Normal. Oropharynx - clear no erythe

## 2021-04-28 ENCOUNTER — HOSPITAL ENCOUNTER (OUTPATIENT)
Dept: MAMMOGRAPHY | Facility: HOSPITAL | Age: 46
Discharge: HOME | End: 2021-04-28
Attending: INTERNAL MEDICINE
Payer: COMMERCIAL

## 2021-04-28 DIAGNOSIS — Z12.31 ENCOUNTER FOR SCREENING MAMMOGRAM FOR MALIGNANT NEOPLASM OF BREAST: ICD-10-CM

## 2021-04-28 PROCEDURE — 77067 SCR MAMMO BI INCL CAD: CPT | Performed by: INTERNAL MEDICINE

## 2021-04-28 PROCEDURE — 77063 BREAST TOMOSYNTHESIS BI: CPT | Performed by: INTERNAL MEDICINE

## 2021-05-26 ENCOUNTER — TELEPHONE (OUTPATIENT)
Dept: GASTROENTEROLOGY | Facility: CLINIC | Age: 46
End: 2021-05-26

## 2021-05-26 ENCOUNTER — LAB ENCOUNTER (OUTPATIENT)
Dept: LAB | Facility: HOSPITAL | Age: 46
End: 2021-05-26
Attending: NURSE PRACTITIONER
Payer: COMMERCIAL

## 2021-05-26 ENCOUNTER — OFFICE VISIT (OUTPATIENT)
Dept: GASTROENTEROLOGY | Facility: CLINIC | Age: 46
End: 2021-05-26
Payer: COMMERCIAL

## 2021-05-26 VITALS
WEIGHT: 155 LBS | BODY MASS INDEX: 26.46 KG/M2 | HEIGHT: 64 IN | SYSTOLIC BLOOD PRESSURE: 94 MMHG | DIASTOLIC BLOOD PRESSURE: 61 MMHG | HEART RATE: 83 BPM

## 2021-05-26 DIAGNOSIS — Z80.0 FAMILY HISTORY OF COLON CANCER: ICD-10-CM

## 2021-05-26 DIAGNOSIS — Z12.11 COLON CANCER SCREENING: ICD-10-CM

## 2021-05-26 DIAGNOSIS — R10.31 BILATERAL LOWER ABDOMINAL CRAMPING: ICD-10-CM

## 2021-05-26 DIAGNOSIS — K64.9 HEMORRHOIDS, UNSPECIFIED HEMORRHOID TYPE: ICD-10-CM

## 2021-05-26 DIAGNOSIS — K58.2 IRRITABLE BOWEL SYNDROME WITH BOTH CONSTIPATION AND DIARRHEA: ICD-10-CM

## 2021-05-26 DIAGNOSIS — R10.32 BILATERAL LOWER ABDOMINAL CRAMPING: ICD-10-CM

## 2021-05-26 DIAGNOSIS — K21.9 GASTROESOPHAGEAL REFLUX DISEASE, UNSPECIFIED WHETHER ESOPHAGITIS PRESENT: Primary | ICD-10-CM

## 2021-05-26 DIAGNOSIS — K58.9 IRRITABLE BOWEL SYNDROME WITHOUT DIARRHEA: ICD-10-CM

## 2021-05-26 DIAGNOSIS — R11.0 NAUSEA: ICD-10-CM

## 2021-05-26 DIAGNOSIS — Z80.0 FH: GI CANCER: ICD-10-CM

## 2021-05-26 DIAGNOSIS — K58.2 MIXED IRRITABLE BOWEL SYNDROME: ICD-10-CM

## 2021-05-26 DIAGNOSIS — R10.9 ABDOMINAL CRAMPING: ICD-10-CM

## 2021-05-26 DIAGNOSIS — R10.31 ABDOMINAL PAIN, RIGHT LOWER QUADRANT: ICD-10-CM

## 2021-05-26 PROCEDURE — 86140 C-REACTIVE PROTEIN: CPT

## 2021-05-26 PROCEDURE — 3074F SYST BP LT 130 MM HG: CPT | Performed by: NURSE PRACTITIONER

## 2021-05-26 PROCEDURE — 85025 COMPLETE CBC W/AUTO DIFF WBC: CPT

## 2021-05-26 PROCEDURE — 3008F BODY MASS INDEX DOCD: CPT | Performed by: NURSE PRACTITIONER

## 2021-05-26 PROCEDURE — 82607 VITAMIN B-12: CPT

## 2021-05-26 PROCEDURE — 99244 OFF/OP CNSLTJ NEW/EST MOD 40: CPT | Performed by: NURSE PRACTITIONER

## 2021-05-26 PROCEDURE — 36415 COLL VENOUS BLD VENIPUNCTURE: CPT

## 2021-05-26 PROCEDURE — 82784 ASSAY IGA/IGD/IGG/IGM EACH: CPT

## 2021-05-26 PROCEDURE — 3078F DIAST BP <80 MM HG: CPT | Performed by: NURSE PRACTITIONER

## 2021-05-26 PROCEDURE — 83516 IMMUNOASSAY NONANTIBODY: CPT

## 2021-05-26 PROCEDURE — 85652 RBC SED RATE AUTOMATED: CPT

## 2021-05-26 RX ORDER — POLYETHYLENE GLYCOL 3350, SODIUM CHLORIDE, SODIUM BICARBONATE, POTASSIUM CHLORIDE 420; 11.2; 5.72; 1.48 G/4L; G/4L; G/4L; G/4L
POWDER, FOR SOLUTION ORAL
Qty: 4000 ML | Refills: 0 | Status: ON HOLD | OUTPATIENT
Start: 2021-05-26 | End: 2021-06-30

## 2021-05-26 RX ORDER — PANTOPRAZOLE SODIUM 40 MG/1
40 TABLET, DELAYED RELEASE ORAL
Qty: 30 TABLET | Refills: 3 | Status: SHIPPED | OUTPATIENT
Start: 2021-05-26 | End: 2021-08-17

## 2021-05-26 NOTE — PATIENT INSTRUCTIONS
-probiotic (align) and add benefiber  -labs  -start pantoprazole 40 mg once daily 30 min prior to first meal of the day  -reflux diet modification    1.  Schedule colonoscopy/egd with TL kerr/ Dr. Gutierrez Izaguirre [Diagnosis:fhx colon ca, ibs, hemorrhoids, gerd, nausea] educational content on 6/1/2019  © 2323-7703 [employer]. [facility] [place] [address] [place]. All rights reserved. This information is not intended as a substitute for professional medical care.  Always follow your healthcare profession

## 2021-05-26 NOTE — TELEPHONE ENCOUNTER
Rescheduled for:  Colonoscopy - 97418 & EGD - 04287  Provider Name:  Dr. Crystal Shelton  Date:  FROM - 6/17/21                   TO - 6/30/21  [facility]:  [facility]  Sedation:  MAC  Time:  FROM - 1:00 pm                     TO - 7:30 am (pt is aware to arrive at 6:3

## 2021-05-26 NOTE — TELEPHONE ENCOUNTER
Scheduled for:  Colonoscopy [phone]; -[phone]  Provider Name:  Dr Joshua Schwarz  Date:  06/17/2021  Location:  [facility]  Sedation:  MAC  Time:  1:00pm (pt is aware to arrive at 12:00pm)  Prep:  Colyte  Meds/Allergies Reconciled?:  Elisabet/APN reviewed.   Diagnosis with c

## 2021-05-26 NOTE — TELEPHONE ENCOUNTER
EMILIOM in regards to procedure. [facility] ext [phone] for scheduling.

## 2021-06-28 ENCOUNTER — LAB ENCOUNTER (OUTPATIENT)
Dept: LAB | Facility: HOSPITAL | Age: 46
End: 2021-06-28
Attending: INTERNAL MEDICINE
Payer: COMMERCIAL

## 2021-06-28 DIAGNOSIS — Z01.818 PRE-OP TESTING: ICD-10-CM

## 2021-06-28 NOTE — PAT NURSING NOTE
Covid-19 vaccine not documented in system. Covid-19 test scheduled 6/28/21. RN confirmed pt procedure at [facility].

## 2021-06-29 LAB — SARS-COV-2 RNA RESP QL NAA+PROBE: NOT DETECTED

## 2021-06-30 ENCOUNTER — ANESTHESIA (OUTPATIENT)
Dept: ENDOSCOPY | Age: 46
End: 2021-06-30
Payer: COMMERCIAL

## 2021-06-30 ENCOUNTER — ANESTHESIA EVENT (OUTPATIENT)
Dept: ENDOSCOPY | Age: 46
End: 2021-06-30
Payer: COMMERCIAL

## 2021-06-30 ENCOUNTER — HOSPITAL ENCOUNTER (OUTPATIENT)
Age: 46
Setting detail: HOSPITAL OUTPATIENT SURGERY
Discharge: HOME | End: 2021-06-30
Attending: INTERNAL MEDICINE | Admitting: INTERNAL MEDICINE
Payer: COMMERCIAL

## 2021-06-30 VITALS
WEIGHT: 156 LBS | HEIGHT: 64 IN | OXYGEN SATURATION: 98 % | BODY MASS INDEX: 26.63 KG/M2 | HEART RATE: 81 BPM | DIASTOLIC BLOOD PRESSURE: 69 MMHG | RESPIRATION RATE: 19 BRPM | TEMPERATURE: 98 F | SYSTOLIC BLOOD PRESSURE: 94 MMHG

## 2021-06-30 DIAGNOSIS — K58.9 IRRITABLE BOWEL SYNDROME WITHOUT DIARRHEA: ICD-10-CM

## 2021-06-30 DIAGNOSIS — Z12.11 COLON CANCER SCREENING: ICD-10-CM

## 2021-06-30 DIAGNOSIS — Z80.0 FH: GI CANCER: ICD-10-CM

## 2021-06-30 DIAGNOSIS — Z01.818 PRE-OP TESTING: Primary | ICD-10-CM

## 2021-06-30 DIAGNOSIS — R10.31 ABDOMINAL PAIN, RIGHT LOWER QUADRANT: ICD-10-CM

## 2021-06-30 DIAGNOSIS — K21.9 GASTROESOPHAGEAL REFLUX DISEASE, UNSPECIFIED WHETHER ESOPHAGITIS PRESENT: ICD-10-CM

## 2021-06-30 LAB — COLONOSCOPY STUDY: ABNORMAL

## 2021-06-30 PROCEDURE — 88305 TISSUE EXAM BY PATHOLOGIST: CPT | Performed by: INTERNAL MEDICINE

## 2021-06-30 PROCEDURE — 88313 SPECIAL STAINS GROUP 2: CPT | Performed by: INTERNAL MEDICINE

## 2021-06-30 PROCEDURE — 45380 COLONOSCOPY AND BIOPSY: CPT | Performed by: INTERNAL MEDICINE

## 2021-06-30 PROCEDURE — 88312 SPECIAL STAINS GROUP 1: CPT | Performed by: INTERNAL MEDICINE

## 2021-06-30 PROCEDURE — 43239 EGD BIOPSY SINGLE/MULTIPLE: CPT | Performed by: INTERNAL MEDICINE

## 2021-06-30 PROCEDURE — 99070 SPECIAL SUPPLIES PHYS/QHP: CPT | Performed by: INTERNAL MEDICINE

## 2021-06-30 RX ORDER — SODIUM CHLORIDE, SODIUM LACTATE, POTASSIUM CHLORIDE, CALCIUM CHLORIDE 600; 310; 30; 20 MG/100ML; MG/100ML; MG/100ML; MG/100ML
INJECTION, SOLUTION INTRAVENOUS CONTINUOUS
Status: DISCONTINUED | OUTPATIENT
Start: 2021-06-30 | End: 2021-06-30

## 2021-06-30 RX ORDER — NALOXONE HYDROCHLORIDE 0.4 MG/ML
80 INJECTION, SOLUTION INTRAMUSCULAR; INTRAVENOUS; SUBCUTANEOUS AS NEEDED
Status: DISCONTINUED | OUTPATIENT
Start: 2021-06-30 | End: 2021-06-30

## 2021-06-30 RX ORDER — LIDOCAINE HYDROCHLORIDE 10 MG/ML
INJECTION, SOLUTION EPIDURAL; INFILTRATION; INTRACAUDAL; PERINEURAL AS NEEDED
Status: DISCONTINUED | OUTPATIENT
Start: 2021-06-30 | End: 2021-06-30 | Stop reason: SURG

## 2021-06-30 RX ADMIN — SODIUM CHLORIDE, SODIUM LACTATE, POTASSIUM CHLORIDE, CALCIUM CHLORIDE: 600; 310; 30; 20 INJECTION, SOLUTION INTRAVENOUS at 07:31:00

## 2021-06-30 RX ADMIN — SODIUM CHLORIDE, SODIUM LACTATE, POTASSIUM CHLORIDE, CALCIUM CHLORIDE: 600; 310; 30; 20 INJECTION, SOLUTION INTRAVENOUS at 07:51:00

## 2021-06-30 RX ADMIN — SODIUM CHLORIDE, SODIUM LACTATE, POTASSIUM CHLORIDE, CALCIUM CHLORIDE: 600; 310; 30; 20 INJECTION, SOLUTION INTRAVENOUS at 07:56:00

## 2021-06-30 RX ADMIN — LIDOCAINE HYDROCHLORIDE 40 MG: 10 INJECTION, SOLUTION EPIDURAL; INFILTRATION; INTRACAUDAL; PERINEURAL at 07:34:00

## 2021-06-30 RX ADMIN — SODIUM CHLORIDE, SODIUM LACTATE, POTASSIUM CHLORIDE, CALCIUM CHLORIDE: 600; 310; 30; 20 INJECTION, SOLUTION INTRAVENOUS at 07:46:00

## 2021-06-30 NOTE — ANESTHESIA POSTPROCEDURE EVALUATION
Patient: Yessenia Casas    Procedure Summary     Date: 06/30/21 Room / Location: [facility] ENDOSCOPY 01 / [facility] ENDO    Anesthesia Start: 0221 Anesthesia Stop: 6032    Procedures:       COLONOSCOPY wiwth biopsy (N/A )      ESOPHAGOGASTRODUODENOSCOPY (EGD) wit

## 2021-06-30 NOTE — H&P
Pre Procedure History & Physical Examination    Patient Name: Adia Renae  MRN: [id number]  [facility]: [id number]  YOB: 1975    Diagnosis: reflux, urgency, constipation, family history of colon CA    Levocetirizine Dihydrochloride (Claudeen Kaiser , Unknown at Unknown time  loratadine 10 MG Oral Tab, Take 10 mg by mouth as needed.   , Disp: , Rfl: , 6/28/2021 at Unknown time      lactated ringers infusion, , Intravenous, Continuous        Allergies:   Eucalyptus Oil          SHORTNESS OF BREATH  [place] and rhythm, the extremities are warm and well perfused   Lung: Moves air well;  No labored breathing  Abdomen: soft, non-tender exam in all quadrants without rigidity or guarding, non-distended, no abnormal bowel sounds noted, no masses are palpated  Skin:

## 2021-06-30 NOTE — OPERATIVE REPORT
ESOPHAGOGASTRODUODENOSCOPY (EGD) & COLONOSCOPY REPORT    Maureen Simmons     1975 Age 39year old   PCP Lei Boss MD Endoscopist Trevon Rodriguez MD     Date of procedure: 21    Procedure: EGD w/ biopsies & Colonoscopy w/biopsies    Pre-oper withdrew the instrument from the patient who tolerated the procedure well. Complications: None    EGD findings:      1. Esophagus: The squamocolumnar junction was noted at 40 cm and appeared regular.  The GE junction was noted at 40 cm from the incisors

## 2021-06-30 NOTE — ANESTHESIA PREPROCEDURE EVALUATION
Anesthesia PreOp Note    HPI:     Kerry Olmstead is a 39year old female who presents for preoperative consultation requested by: Livia Alarcon MD    Date of Surgery: 6/30/2021    Procedure(s):  COLONOSCOPY/ESOPHAGOGASTRODUODENOSCOPY (EGD)  ESOPHAGOGASTRO equivalent if needed. , Disp: 4000 mL, Rfl: 0, Unknown at Unknown time  guaiFENesin  MG Oral Tablet 12 Hr, Take 1,200 mg by mouth 2 (two) times daily. , Disp: , Rfl: , Unknown at Unknown time  Azelastine HCl 0.15 % Nasal Solution, by Nasal route as nee Number of children: Not on file      Years of education: Not on file      Highest education level: Not on file    Occupational History      Not on file    Tobacco Use      Smoking status: Former Smoker      Smokeless tobacco: Never Used    Substance and Se Ex-Partner:       Emotionally Abused:       Physically Abused:       Sexually Abused:     Available pre-op labs reviewed.   Lab Results   Component Value Date    WBC 6.9 05/26/2021    RBC 4.99 05/26/2021    HGB 12.9 05/26/2021    HCT 40.6 05/26/2021    MCV

## 2021-07-06 ENCOUNTER — PATIENT MESSAGE (OUTPATIENT)
Dept: GASTROENTEROLOGY | Facility: CLINIC | Age: 46
End: 2021-07-06

## 2021-07-06 ENCOUNTER — TELEPHONE (OUTPATIENT)
Dept: GASTROENTEROLOGY | Facility: CLINIC | Age: 46
End: 2021-07-06

## 2021-07-06 DIAGNOSIS — A04.8 HELICOBACTER PYLORI (H. PYLORI) INFECTION: Primary | ICD-10-CM

## 2021-07-06 RX ORDER — CLARITHROMYCIN 500 MG/1
500 TABLET, COATED ORAL 2 TIMES DAILY
Qty: 28 TABLET | Refills: 0 | Status: SHIPPED | OUTPATIENT
Start: 2021-07-06 | End: 2021-07-20

## 2021-07-06 RX ORDER — AMOXICILLIN 500 MG/1
1000 TABLET, FILM COATED ORAL 2 TIMES DAILY
Qty: 56 TABLET | Refills: 0 | Status: SHIPPED | OUTPATIENT
Start: 2021-07-06 | End: 2021-07-20

## 2021-07-06 NOTE — TELEPHONE ENCOUNTER
From: Luciana Felty  To: Windy Alarcon. Eva Dani  Sent: 7/6/2021 2:00 PM CDT  Subject: Test Results Question    Good Afternoon,  Thankfully My tests results are negative.  Why does it say Abnormal? What are the next steps to figuring out what is wrong o

## 2021-07-06 NOTE — TELEPHONE ENCOUNTER
[facility]-    See patients message below. Underwent egd/colonoscopy with Dr. Edison Berumen 06/30/2021. Last office visit 05/26/2021. Advise on results and recommendations if appropriate. Thank you!

## 2021-07-06 NOTE — TELEPHONE ENCOUNTER
From: Maria Teresa Glasgow  To: Pao Sauer. Parul Velasco  Sent: 7/6/2021 2:02 PM CDT  Subject: Test Results Question    Also, I just saw that my test was positive for h-pylori. What does this mean? How can it be treated?

## 2021-07-06 NOTE — TELEPHONE ENCOUNTER
Entered into Epic. Recall CLN in 5 years per review of results by Elsa Snyder. Last CLN done 06/30/2021. Recall entered into Patient Outreach for 06/30/2026. HM updated.      Please refer to patient message 07/06/2021 for recall details and ly

## 2021-07-06 NOTE — TELEPHONE ENCOUNTER
Path from cln/egd 6/2021 reviewed:    Final Diagnosis:      A. Duodenum; biopsy:  · Fragments of duodenal mucosa with preserved villous architecture and mildly increased intraepithelial lymphocytes (see comment).   · No evidence of dysplasia or carcinoma id

## 2021-07-06 NOTE — TELEPHONE ENCOUNTER
Attempted to reach Angelita Taylor to review results 2/2 h pylori education and therapy ordered. Left message to call back. Placed 5 year colonoscopy recall. Refer to TE 07/06/2021.      Will await call back and/or follow up to review results and recommendati

## 2021-07-06 NOTE — TELEPHONE ENCOUNTER
Nursing:  Please contact patient with below results and recommendations-  -celiac testing negative 5/2021  -recommend avoidance of nsaids  -no evidence of barretts  -please discuss significant of h.pylori and start triple therapy (continue pantoprazole whi

## 2021-07-08 ENCOUNTER — TELEPHONE (OUTPATIENT)
Dept: GASTROENTEROLOGY | Facility: CLINIC | Age: 46
End: 2021-07-08

## 2021-07-08 NOTE — TELEPHONE ENCOUNTER
Followed up with Donna Saenz to review results and recommendations. Patient is currently in a work meeting and requesting call back at 3 PM.     Will call back.

## 2021-07-08 NOTE — TELEPHONE ENCOUNTER
Entered into Epic. Recall h pylori eradication testing for 8 weeks per horace Og. Refer to patient message 07/06/2021, for h pylori education and colonoscopy/egd results and recommendations reviewed with patient.   Recall entered into Patient O

## 2021-07-08 NOTE — TELEPHONE ENCOUNTER
Reached out to Patrick Lynn to review results and recommendations 2/2 h pylori results/treatment and further recommendations per aprn message below. Discussed the following education with the patient and answered all her questions and concerns.      Patient will p FROM THE PHARMACY- NO EXCEPTIONS. *  • You have been prescribed a total of  3 medications for your treatment.    • If you are taking a statin medication (for cholesterol) or tamsulosin you will need to hold these therapies while taking the antibiotics as the or aciphex you will need to stop taking these medications 2 weeks before you submit the stool sample to make sure results are accurate. What next?   If you have any additional questions and/or concerns, or experience a bad reaction to the medications you

## 2021-08-04 ENCOUNTER — HOSPITAL ENCOUNTER (OUTPATIENT)
Dept: GENERAL RADIOLOGY | Age: 46
Discharge: HOME | End: 2021-08-04
Attending: PHYSICAL MEDICINE & REHABILITATION
Payer: COMMERCIAL

## 2021-08-04 ENCOUNTER — OFFICE VISIT (OUTPATIENT)
Dept: NEUROLOGY | Facility: CLINIC | Age: 46
End: 2021-08-04
Payer: COMMERCIAL

## 2021-08-04 VITALS
SYSTOLIC BLOOD PRESSURE: 102 MMHG | HEIGHT: 64 IN | WEIGHT: 157 LBS | DIASTOLIC BLOOD PRESSURE: 68 MMHG | HEART RATE: 86 BPM | BODY MASS INDEX: 26.8 KG/M2

## 2021-08-04 DIAGNOSIS — M17.0 PRIMARY OSTEOARTHRITIS OF KNEES, BILATERAL: Primary | ICD-10-CM

## 2021-08-04 DIAGNOSIS — M17.0 PRIMARY OSTEOARTHRITIS OF KNEES, BILATERAL: ICD-10-CM

## 2021-08-04 PROCEDURE — 3074F SYST BP LT 130 MM HG: CPT | Performed by: PHYSICAL MEDICINE & REHABILITATION

## 2021-08-04 PROCEDURE — 73564 X-RAY EXAM KNEE 4 OR MORE: CPT | Performed by: PHYSICAL MEDICINE & REHABILITATION

## 2021-08-04 PROCEDURE — 3078F DIAST BP <80 MM HG: CPT | Performed by: PHYSICAL MEDICINE & REHABILITATION

## 2021-08-04 PROCEDURE — 3008F BODY MASS INDEX DOCD: CPT | Performed by: PHYSICAL MEDICINE & REHABILITATION

## 2021-08-04 PROCEDURE — 99214 OFFICE O/P EST MOD 30 MIN: CPT | Performed by: PHYSICAL MEDICINE & REHABILITATION

## 2021-08-04 NOTE — PROGRESS NOTES
[address]  FOLLOW UP EVALUATION    Chief Complaint: back pain. HISTORY OF PRESENT ILLNESS:   Patient presents with:  Knee Pain: LOV 11/02/21 here to discuss 8/10 left knee pain since fall 1 month ago.  No medical history of chronic allergic conjunctivitis, left cervical radiculopathy who presents with mid back pain and low back pain. Patient denies any injury or trauma. 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   Prior to that, she was experienc Azelastine HCl 0.15 % Nasal Solution by Nasal route as needed. • Calcium Carbonate Antacid 500 MG Oral Chew Tab Chew 1 tablet by mouth as needed. • Halobetasol Propionate 0.05 % External Cream Apply thin layer to affected area(s).  30 g 2   • Levo PHYSICAL EXAM:   /68   Pulse 86   Ht 64\"   Wt 157 lb (71.2 kg)   LMP 04/14/2021 (Approximate)   BMI 26.95 kg/m²   General: No immediate distress  Head: Normocephalic/ Atraumatic  Eyes: Extra-occular movements intact.    Ears: No auricular hemat 03/15/2021    CA 8.5 03/15/2021    OSMOCALC 281 03/15/2021    ALKPHO 58 03/15/2021    AST 15 03/15/2021    ALT 21 03/15/2021    BILT 0.5 03/15/2021    TP 7.7 03/15/2021    ALB 3.6 03/15/2021    GLOBULIN 4.1 03/15/2021     03/15/2021    K 3.6 03/15/20

## 2021-08-05 ENCOUNTER — TELEPHONE (OUTPATIENT)
Dept: NEUROLOGY | Facility: CLINIC | Age: 46
End: 2021-08-05

## 2021-08-12 ENCOUNTER — TELEPHONE (OUTPATIENT)
Dept: PHYSICAL THERAPY | Facility: HOSPITAL | Age: 46
End: 2021-08-12

## 2021-08-17 RX ORDER — PANTOPRAZOLE SODIUM 40 MG/1
TABLET, DELAYED RELEASE ORAL
Qty: 90 TABLET | Refills: 1 | Status: SHIPPED | OUTPATIENT
Start: 2021-08-17

## 2021-08-17 NOTE — TELEPHONE ENCOUNTER
Requested Prescriptions     Pending Prescriptions Disp Refills   • PANTOPRAZOLE 40 MG Oral Tab EC [Pharmacy Med Name: PANTOPRAZOLE SOD DR 40 MG TAB] 90 tablet 1     Sig: TAKE 1 TABLET BY MOUTH EVERY DAY IN THE MORNING BEFORE BREAKFAST       LOV:   05/26/20

## 2021-08-18 ENCOUNTER — OFFICE VISIT (OUTPATIENT)
Dept: PHYSICAL THERAPY | Age: 46
End: 2021-08-18
Attending: PHYSICAL MEDICINE & REHABILITATION
Payer: COMMERCIAL

## 2021-08-18 DIAGNOSIS — M17.0 PRIMARY OSTEOARTHRITIS OF KNEES, BILATERAL: ICD-10-CM

## 2021-08-18 PROCEDURE — 97110 THERAPEUTIC EXERCISES: CPT

## 2021-08-18 PROCEDURE — 97161 PT EVAL LOW COMPLEX 20 MIN: CPT

## 2021-08-18 PROCEDURE — 97530 THERAPEUTIC ACTIVITIES: CPT

## 2021-08-18 NOTE — PROGRESS NOTES
LOWER EXTREMITY EVALUATION:   Referring Physician: Dr. Kareem Cesar  Diagnosis:    Primary osteoarthritis of knees, bilateral (M17.0) Date of Service: 8/18/2021     PATIENT SUMMARY   Mati Conner is a 42year old female who presents to therapy today with co over the other. Signs and symptoms are consistent with rehab diagnosis of impaired posture; muscle imbalance; valgus L knee and insufficient B dorsiflexion contributing to gait deficits. Pt and PT discussed evaluation findings, pathology, POC and HEP.   Pt also provided recommendations for possible soreness after evaluation and importance of remaining active.   Patient was instructed in and issued a HEP for: supine and sitting quad set; HS stretch; bridging  Manual: patellofemoral joint mob at L; followed by care.    Thank you for your referral. Please co-sign or sign and return this letter via fax as soon as possible to [phone].  If you have any questions, please contact me at Dept: [phone]    Sincerely,  Electronically signed by therapist: Dakotah Marie

## 2021-08-25 RX ORDER — LEVOCETIRIZINE DIHYDROCHLORIDE 5 MG/1
TABLET, FILM COATED ORAL
Qty: 90 TABLET | Refills: 1 | OUTPATIENT
Start: 2021-08-25

## 2021-08-25 NOTE — TELEPHONE ENCOUNTER
LOV 8/3/20    Spoke with patient, says she doesn't need refill now. Only takes it as needed. Aware she will need appt with Dr. Cookie Paris if she wants future refills. Verbalizes understanding.

## 2021-09-01 ENCOUNTER — OFFICE VISIT (OUTPATIENT)
Dept: PHYSICAL THERAPY | Age: 46
End: 2021-09-01
Attending: PHYSICAL MEDICINE & REHABILITATION
Payer: COMMERCIAL

## 2021-09-01 PROCEDURE — 97110 THERAPEUTIC EXERCISES: CPT

## 2021-09-01 PROCEDURE — 97140 MANUAL THERAPY 1/> REGIONS: CPT

## 2021-09-01 NOTE — PROGRESS NOTES
Dx:Primary osteoarthritis of knees, bilateral (M17.0)          Insurance (Authorized # of Visits):  [employer] PPO cert end [id number]      Authorizing Physician: Dr. Charlene aWng MD visit: none scheduled  Fall Risk: standard         Precautions: n/a       PT edgardo ROM and strengthening. Progress WBing. Date: 9/1/2021  TX#: 2/8 Date:                 TX#: 3/ Date:                 TX#: 4/ Date:                 TX#: 5/ Date:    Tx#: 6/   Therapeutic Exercise  Supine: HS stretch 20\"x5  Supine: quad set 10x5\"  Bridging

## 2021-09-08 ENCOUNTER — APPOINTMENT (OUTPATIENT)
Dept: PHYSICAL THERAPY | Age: 46
End: 2021-09-08
Attending: PHYSICAL MEDICINE & REHABILITATION
Payer: COMMERCIAL

## 2021-09-15 ENCOUNTER — OFFICE VISIT (OUTPATIENT)
Dept: PHYSICAL THERAPY | Age: 46
End: 2021-09-15
Attending: PHYSICAL MEDICINE & REHABILITATION
Payer: COMMERCIAL

## 2021-09-15 PROCEDURE — 97140 MANUAL THERAPY 1/> REGIONS: CPT

## 2021-09-15 PROCEDURE — 97110 THERAPEUTIC EXERCISES: CPT

## 2021-09-15 NOTE — PROGRESS NOTES
Dx:Primary osteoarthritis of knees, bilateral (M17.0)          Insurance (Authorized # of Visits):  [employer] PPO cert end [id number]      Authorizing Physician: Dr. Aung Wang MD visit: none scheduled  Fall Risk: standard         Precautions: n/a       PT edgardo her infant daughter without c/o knee pain. (IN PROGRESS)  5. Patient will perform good body mechanics for sit to/from stand to rise up from a chair without difficulty. (PARTIALLY MET - has c/o knee stiffness)  6.  Patient will have at least 10 deg B ankle d

## 2021-09-16 ENCOUNTER — TELEPHONE (OUTPATIENT)
Dept: INTERNAL MEDICINE CLINIC | Facility: CLINIC | Age: 46
End: 2021-09-16

## 2021-09-16 ENCOUNTER — NURSE TRIAGE (OUTPATIENT)
Dept: INTERNAL MEDICINE CLINIC | Facility: CLINIC | Age: 46
End: 2021-09-16

## 2021-09-16 ENCOUNTER — LAB ENCOUNTER (OUTPATIENT)
Dept: LAB | Age: 46
End: 2021-09-16
Attending: INTERNAL MEDICINE
Payer: COMMERCIAL

## 2021-09-16 ENCOUNTER — VIRTUAL PHONE E/M (OUTPATIENT)
Dept: INTERNAL MEDICINE CLINIC | Facility: CLINIC | Age: 46
End: 2021-09-16
Payer: COMMERCIAL

## 2021-09-16 DIAGNOSIS — J02.9 SORE THROAT: Primary | ICD-10-CM

## 2021-09-16 DIAGNOSIS — Z20.822 LAB TEST NEGATIVE FOR COVID-19 VIRUS: ICD-10-CM

## 2021-09-16 DIAGNOSIS — Z20.822 EXPOSURE TO 2019 NOVEL CORONAVIRUS: ICD-10-CM

## 2021-09-16 PROCEDURE — 87081 CULTURE SCREEN ONLY: CPT

## 2021-09-16 PROCEDURE — 99213 OFFICE O/P EST LOW 20 MIN: CPT | Performed by: INTERNAL MEDICINE

## 2021-09-16 RX ORDER — AZITHROMYCIN 250 MG/1
TABLET, FILM COATED ORAL
Qty: 6 TABLET | Refills: 0 | Status: SHIPPED | OUTPATIENT
Start: 2021-09-16 | End: 2021-09-21

## 2021-09-16 NOTE — TELEPHONE ENCOUNTER
Action Requested: Summary for Provider     []  Critical Lab, Recommendations Needed  [] Need Additional Advice  []   FYI    []   Need Orders  [] Need Medications Sent to Pharmacy  []  Other     SUMMARY: Per protocol advised office visit.   Scheduled today a

## 2021-09-16 NOTE — TELEPHONE ENCOUNTER
Pt calling checking if she had strep order from visit today. Pt about to head to the lab requesting order. Please advise.

## 2021-09-16 NOTE — TELEPHONE ENCOUNTER
The patient stated she at the lab and there are no orders. Discussed with Dr. Pebbles Pina and he will place the strep order.

## 2021-09-17 NOTE — PROGRESS NOTES
Virtual Telephone Check-In    Marcia Green verbally consents to a Virtual/Telephone Check-In visit on 09/16/21. Patient has been referred to the [facility] website at [email or web address] to review the yearly Consent to Treat document.     Patient underst

## 2021-09-22 ENCOUNTER — OFFICE VISIT (OUTPATIENT)
Dept: PHYSICAL THERAPY | Age: 46
End: 2021-09-22
Attending: PHYSICAL MEDICINE & REHABILITATION
Payer: COMMERCIAL

## 2021-09-22 ENCOUNTER — TELEPHONE (OUTPATIENT)
Dept: GASTROENTEROLOGY | Facility: CLINIC | Age: 46
End: 2021-09-22

## 2021-09-22 PROCEDURE — 97110 THERAPEUTIC EXERCISES: CPT

## 2021-09-22 PROCEDURE — 97140 MANUAL THERAPY 1/> REGIONS: CPT

## 2021-09-22 NOTE — PROGRESS NOTES
Dx:Primary osteoarthritis of knees, bilateral (M17.0)          Insurance (Authorized # of Visits):  [facility] PPO cert end [id number]      Authorizing Physician: Dr. Lolita Greer  Next MD visit: none scheduled  Fall Risk: standard         Precautions: n/a       PT edgardo rise up from a chair without difficulty. (PARTIALLY MET - has c/o knee stiffness)  6. Patient will have at least 10 deg B ankle dorsiflexion ROM for adequate foot clearance during gait. (MET)  9/22/2021: reviewed goals with pt.      Plan: Focus on ROM and s

## 2021-09-22 NOTE — TELEPHONE ENCOUNTER
Eradication testing active in chart and due at this time. Contacted at preferred number to notify. Instructed stool kit may be obtained from any [facility] lab location of her choice.      Informed orders are active and may be completed at her earliest conv

## 2021-09-27 ENCOUNTER — VIRTUAL PHONE E/M (OUTPATIENT)
Dept: INTERNAL MEDICINE CLINIC | Facility: CLINIC | Age: 46
End: 2021-09-27
Payer: COMMERCIAL

## 2021-09-27 DIAGNOSIS — R09.82 POST-NASAL DRIP: ICD-10-CM

## 2021-09-27 DIAGNOSIS — J02.9 SORE THROAT: Primary | ICD-10-CM

## 2021-09-27 PROCEDURE — 99213 OFFICE O/P EST LOW 20 MIN: CPT | Performed by: INTERNAL MEDICINE

## 2021-09-27 NOTE — PROGRESS NOTES
Virtual Telephone Check-In    Cielo King verbally consents to a Virtual/Telephone Check-In visit on 09/27/21. Patient has been referred to the [facility] website at [email or web address] to review the yearly Consent to Treat document.     Patient underst

## 2021-09-29 ENCOUNTER — APPOINTMENT (OUTPATIENT)
Dept: PHYSICAL THERAPY | Age: 46
End: 2021-09-29
Payer: COMMERCIAL

## 2021-10-06 ENCOUNTER — TELEPHONE (OUTPATIENT)
Dept: PHYSICAL THERAPY | Age: 46
End: 2021-10-06

## 2021-10-06 ENCOUNTER — APPOINTMENT (OUTPATIENT)
Dept: PHYSICAL THERAPY | Age: 46
End: 2021-10-06
Payer: COMMERCIAL

## 2021-10-13 ENCOUNTER — OFFICE VISIT (OUTPATIENT)
Dept: PHYSICAL THERAPY | Age: 46
End: 2021-10-13
Attending: PHYSICAL MEDICINE & REHABILITATION
Payer: COMMERCIAL

## 2021-10-13 PROCEDURE — 97140 MANUAL THERAPY 1/> REGIONS: CPT

## 2021-10-13 PROCEDURE — 97110 THERAPEUTIC EXERCISES: CPT

## 2021-10-13 NOTE — PROGRESS NOTES
Dx:Primary osteoarthritis of knees, bilateral (M17.0)          Insurance (Authorized # of Visits):  [employer] PPO cert end [id number]      Authorizing Physician: Dr. Koko Delgadillo  Next MD visit: none scheduled  Fall Risk: standard         Precautions: n/a       PT edgardo has responded well with B knee ROM WFL and B LE strength at 5/5 MMT grade in all major planes. Her hamstring length has improved bilaterally, but with L knee stiffer compared to R.  She has no c/o symptoms with overpressure at R knee into flexion and extens [de-identified] certification required:  No    Date: 9/1/2021  TX#: 2/8 Date: 9/15/2021               TX#: 3/8 Date:  9/22/2021  TX#: 4/8 Date: 10/13/2021                TX#: 5/8 Date:    Tx#: 6/   Therapeutic Exercise  Supine: HS stretch 20\"x5  Supine: qu

## 2021-10-29 ENCOUNTER — TELEMEDICINE (OUTPATIENT)
Dept: INTERNAL MEDICINE CLINIC | Facility: CLINIC | Age: 46
End: 2021-10-29
Payer: COMMERCIAL

## 2021-10-29 DIAGNOSIS — J01.10 ACUTE FRONTAL SINUSITIS, RECURRENCE NOT SPECIFIED: Primary | ICD-10-CM

## 2021-10-29 PROCEDURE — 99213 OFFICE O/P EST LOW 20 MIN: CPT | Performed by: INTERNAL MEDICINE

## 2021-10-29 RX ORDER — AMOXICILLIN AND CLAVULANATE POTASSIUM 875; 125 MG/1; MG/1
1 TABLET, FILM COATED ORAL 2 TIMES DAILY
Qty: 20 TABLET | Refills: 0 | Status: SHIPPED | OUTPATIENT
Start: 2021-10-29 | End: 2021-11-08

## 2021-10-29 NOTE — PROGRESS NOTES
This is a telemedicine visit with live, interactive video and audio. Patient understands and accepts financial responsibility for any deductible, co-insurance and/or co-pays associated with this service.     SUBJECTIVE    She scheduled video visit today Nasal Solution by Nasal route as needed. • Calcium Carbonate Antacid 500 MG Oral Chew Tab Chew 1 tablet by mouth as needed. • Halobetasol Propionate 0.05 % External Cream Apply thin layer to affected area(s).  30 g 2   • Levocetirizine Dihydrochlo

## 2021-11-01 ENCOUNTER — OFFICE VISIT (OUTPATIENT)
Dept: ALLERGY | Facility: CLINIC | Age: 46
End: 2021-11-01
Payer: COMMERCIAL

## 2021-11-01 VITALS
SYSTOLIC BLOOD PRESSURE: 118 MMHG | OXYGEN SATURATION: 100 % | DIASTOLIC BLOOD PRESSURE: 68 MMHG | RESPIRATION RATE: 20 BRPM | HEART RATE: 82 BPM

## 2021-11-01 DIAGNOSIS — J30.89 PERENNIAL ALLERGIC RHINITIS WITH SEASONAL VARIATION: Primary | ICD-10-CM

## 2021-11-01 DIAGNOSIS — J30.2 PERENNIAL ALLERGIC RHINITIS WITH SEASONAL VARIATION: Primary | ICD-10-CM

## 2021-11-01 DIAGNOSIS — J01.90 ACUTE NON-RECURRENT SINUSITIS, UNSPECIFIED LOCATION: ICD-10-CM

## 2021-11-01 DIAGNOSIS — J34.2 NASAL SEPTAL DEVIATION: ICD-10-CM

## 2021-11-01 PROCEDURE — 3074F SYST BP LT 130 MM HG: CPT | Performed by: ALLERGY & IMMUNOLOGY

## 2021-11-01 PROCEDURE — 99214 OFFICE O/P EST MOD 30 MIN: CPT | Performed by: ALLERGY & IMMUNOLOGY

## 2021-11-01 PROCEDURE — 3078F DIAST BP <80 MM HG: CPT | Performed by: ALLERGY & IMMUNOLOGY

## 2021-11-01 RX ORDER — AZELASTINE HYDROCHLORIDE, FLUTICASONE PROPIONATE 137; 50 UG/1; UG/1
1 SPRAY, METERED NASAL 2 TIMES DAILY
Qty: 1 EACH | Refills: 0 | Status: SHIPPED | OUTPATIENT
Start: 2021-11-01

## 2021-11-01 RX ORDER — METHYLPREDNISOLONE 4 MG/1
TABLET ORAL
Qty: 21 TABLET | Refills: 0 | Status: SHIPPED | OUTPATIENT
Start: 2021-11-01

## 2021-11-01 NOTE — PATIENT INSTRUCTIONS
1. AR  Trial of Allegra, fexofenadine 180 mg once a day as a nonsedating antihistamine if having runny nose sneezing itchy watery eyes  Trial of Dymista 1 spray per nostril twice a day in place of Flonase  Patient with prior side effects with Singulair in

## 2021-11-01 NOTE — PROGRESS NOTES
Sarita Marcelo is a 42year old female. HPI:   No chief complaint on file. Patient is a 43-year-old female who presents for follow-up with a chief complaint of allergies. Patient last seen by me in August 2020.     Prior skin testing was positive Smoking status: Former Smoker      Smokeless tobacco: Never Used    Alcohol use: Yes      Comment: rarely    Drug use: Never       Medications (Active prior to today's visit):  Current Outpatient Medications   Medication Sig Dispense Refill   • amoxicillin loss  Gastrointestinal:  Negative for abdominal pain, diarrhea and vomiting  Integumentary:  Negative for pruritus and rash  Respiratory:  Negative for cough, dyspnea and wheezing    PHYSICAL EXAM:   Constitutional: responsive, no acute distress noted  Hea above recommendation    4. Patient defers flu vaccine as she is currently on antibiotics at this time for acute sinusitis    5.   Covid vaccinations up-to-date x2 doses    Patient to contact my office if interested in starting immunotherapy     Orders This

## 2021-12-20 ENCOUNTER — TELEPHONE (OUTPATIENT)
Dept: OTOLARYNGOLOGY | Facility: CLINIC | Age: 46
End: 2021-12-20

## 2021-12-21 ENCOUNTER — OFFICE VISIT (OUTPATIENT)
Dept: OTOLARYNGOLOGY | Facility: CLINIC | Age: 46
End: 2021-12-21
Payer: COMMERCIAL

## 2021-12-21 VITALS — HEIGHT: 64 IN | TEMPERATURE: 100 F | BODY MASS INDEX: 26.29 KG/M2 | WEIGHT: 154 LBS

## 2021-12-21 DIAGNOSIS — J01.40 SUBACUTE PANSINUSITIS: Primary | ICD-10-CM

## 2021-12-21 PROCEDURE — 3008F BODY MASS INDEX DOCD: CPT | Performed by: OTOLARYNGOLOGY

## 2021-12-21 PROCEDURE — 99213 OFFICE O/P EST LOW 20 MIN: CPT | Performed by: OTOLARYNGOLOGY

## 2021-12-21 RX ORDER — DEXAMETHASONE 6 MG/1
TABLET ORAL
Qty: 4 TABLET | Refills: 0 | Status: SHIPPED | OUTPATIENT
Start: 2021-12-21

## 2021-12-21 RX ORDER — CLINDAMYCIN HYDROCHLORIDE 300 MG/1
CAPSULE ORAL
Qty: 30 CAPSULE | Refills: 1 | Status: SHIPPED | OUTPATIENT
Start: 2021-12-21

## 2021-12-21 NOTE — PROGRESS NOTES
Vicky Webber is a 42year old female.   Patient presents with:  Nose Problem: Patient presents with Nasal congestion, sinus pressure and pain      HISTORY OF PRESENT ILLNESS   8/3/2020    The patient presents with incredible amounts of fatigue nasal donnie Marlys Severs, MD   • OTHER SURGICAL HISTORY     • SINUS SURGERY    2016         REVIEW OF SYSTEMS    System Neg/Pos Details   Constitutional Negative Fatigue, fever and weight loss.    ENMT Negative Headaches and neck pain   Eyes Negative Blurred vision and v drainage and cobblestoning of posterior pharyngeal walls noted       Current Outpatient Medications:   •  dexamethasone 6 MG Oral Tab, 1 po daily for 4 days, Disp: 4 tablet, Rfl: 0  •  clindamycin 300 MG Oral Cap, 1 po tid for ten days, Disp: 30 capsule, R sinus blockage which would be amenable to surgery.   2 Environmental allergies  Recurrent infections and also fatigue exacerbating her infections and also her fatigue continue allergy medicines and agree with immunotherapy            Karla Dandy, MD

## 2022-01-03 ENCOUNTER — PATIENT MESSAGE (OUTPATIENT)
Dept: OTOLARYNGOLOGY | Facility: CLINIC | Age: 47
End: 2022-01-03

## 2022-01-03 ENCOUNTER — PATIENT MESSAGE (OUTPATIENT)
Dept: ALLERGY | Facility: CLINIC | Age: 47
End: 2022-01-03

## 2022-01-03 NOTE — TELEPHONE ENCOUNTER
Immunotherapy orders completed. Recommend with patient getting tested for Covid.   Continue with supportive measures

## 2022-01-03 NOTE — TELEPHONE ENCOUNTER
Dr. Estrellita Covington, may we have an order for AIT injections and please see triage below. Patient contacted via telephone. TRIAGE)    Assessment)    Patient contacted via telephone.      Patient offers that she saw Dr. Kayla Fernandez for a sinus infection on 12/21/ information).

## 2022-01-03 NOTE — TELEPHONE ENCOUNTER
Patient contacted via telephone. Patient informed to continue medications as she reported below per Dr. Amy Barron. Patient states that she did have a Rapid Covid-19 test performed that was negative.      Patient agrees to call ENT to ask for further adv

## 2022-01-03 NOTE — TELEPHONE ENCOUNTER
From: Tyshawn Osorio  To: Lesly Zarate MD  Sent: 1/3/2022 11:57 AM CST  Subject: I’m need of support before starting allergy shots    Hello and happy new year.  I am sharing (below) the email I just sent my ENT, in hopes that you might also have some

## 2022-01-06 RX ORDER — PROMETHAZINE HYDROCHLORIDE AND CODEINE PHOSPHATE 6.25; 1 MG/5ML; MG/5ML
2.5 SYRUP ORAL EVERY 4 HOURS PRN
Qty: 50 ML | Refills: 0 | Status: SHIPPED | OUTPATIENT
Start: 2022-01-06

## 2022-01-06 NOTE — TELEPHONE ENCOUNTER
Per Dr. Trenton Olmedo: Kaylene Fontenot prescribed her some cough suppressants if she is getting worse she should see her family practice or internal medicine doctor\"    MyChart sent to patient.

## 2022-01-11 ENCOUNTER — NURSE ONLY (OUTPATIENT)
Dept: ALLERGY | Facility: CLINIC | Age: 47
End: 2022-01-11
Payer: COMMERCIAL

## 2022-01-11 DIAGNOSIS — J30.89 ENVIRONMENTAL AND SEASONAL ALLERGIES: ICD-10-CM

## 2022-01-11 PROCEDURE — 95115 IMMUNOTHERAPY ONE INJECTION: CPT | Performed by: ALLERGY & IMMUNOLOGY

## 2022-01-11 PROCEDURE — 95165 ANTIGEN THERAPY SERVICES: CPT | Performed by: ALLERGY & IMMUNOLOGY

## 2022-01-19 ENCOUNTER — NURSE ONLY (OUTPATIENT)
Dept: ALLERGY | Facility: CLINIC | Age: 47
End: 2022-01-19
Payer: COMMERCIAL

## 2022-01-19 DIAGNOSIS — J30.89 ENVIRONMENTAL AND SEASONAL ALLERGIES: ICD-10-CM

## 2022-01-19 PROCEDURE — 95115 IMMUNOTHERAPY ONE INJECTION: CPT | Performed by: ALLERGY & IMMUNOLOGY

## 2022-01-21 ENCOUNTER — TELEPHONE (OUTPATIENT)
Dept: GASTROENTEROLOGY | Facility: CLINIC | Age: 47
End: 2022-01-21

## 2022-01-26 ENCOUNTER — LAB ENCOUNTER (OUTPATIENT)
Dept: LAB | Age: 47
End: 2022-01-26
Attending: NURSE PRACTITIONER
Payer: COMMERCIAL

## 2022-01-26 ENCOUNTER — NURSE ONLY (OUTPATIENT)
Dept: ALLERGY | Facility: CLINIC | Age: 47
End: 2022-01-26
Payer: COMMERCIAL

## 2022-01-26 DIAGNOSIS — J30.89 ENVIRONMENTAL AND SEASONAL ALLERGIES: ICD-10-CM

## 2022-01-26 DIAGNOSIS — A04.8 HELICOBACTER PYLORI (H. PYLORI) INFECTION: ICD-10-CM

## 2022-01-26 PROCEDURE — 95117 IMMUNOTHERAPY INJECTIONS: CPT | Performed by: ALLERGY & IMMUNOLOGY

## 2022-01-26 PROCEDURE — 87338 HPYLORI STOOL AG IA: CPT

## 2022-01-28 ENCOUNTER — PATIENT MESSAGE (OUTPATIENT)
Dept: GASTROENTEROLOGY | Facility: CLINIC | Age: 47
End: 2022-01-28

## 2022-01-28 LAB — HELICOBACTER PYLORI AG, FECAL: NEGATIVE

## 2022-01-28 NOTE — TELEPHONE ENCOUNTER
Charlotte--    H. pylori results final and negative. Anastasia sent condition update illustrated below. Please advise on further work up if indicated. Last seen in clinic for consultation 05/2021. Reported at time of visit loose stool especially after drinking coffee.      Colonoscopy/egd with Dr. Minoo Mcleod which was normal.     Thank you Statement Selected

## 2022-01-28 NOTE — TELEPHONE ENCOUNTER
From: Sharath Hooks  To: Tanya Bustamante. [facility]   Sent: 1/28/2022 4:36 PM CST  Subject: Question regarding H. PYLORI STOOL ANTIGEN    Hi! I am still having frequent loose stool, especially but not only after drinking coffee in the mornings (interestingly not if I drink coffee in the afternoons). What else could it be? Does this have a remedy? I no longer have to take antacids daily, but it is concerning that I keep having loose stool and some reflux.   Thanks for your time  Rosendo Almeida

## 2022-02-02 ENCOUNTER — TELEPHONE (OUTPATIENT)
Dept: ALLERGY | Facility: CLINIC | Age: 47
End: 2022-02-02

## 2022-02-02 NOTE — TELEPHONE ENCOUNTER
Left message for patient to call back to reschedule allergy injection for tomorrow. We are cancelling her allergy injection in the morning due to inclement weather.

## 2022-02-10 ENCOUNTER — NURSE ONLY (OUTPATIENT)
Dept: ALLERGY | Facility: CLINIC | Age: 47
End: 2022-02-10
Payer: COMMERCIAL

## 2022-02-10 ENCOUNTER — LAB ENCOUNTER (OUTPATIENT)
Dept: LAB | Age: 47
End: 2022-02-10
Attending: NURSE PRACTITIONER
Payer: COMMERCIAL

## 2022-02-10 DIAGNOSIS — J30.89 ENVIRONMENTAL AND SEASONAL ALLERGIES: ICD-10-CM

## 2022-02-10 DIAGNOSIS — R19.7 DIARRHEA, UNSPECIFIED TYPE: ICD-10-CM

## 2022-02-10 PROCEDURE — 82785 ASSAY OF IGE: CPT

## 2022-02-10 PROCEDURE — 95117 IMMUNOTHERAPY INJECTIONS: CPT | Performed by: ALLERGY & IMMUNOLOGY

## 2022-02-10 PROCEDURE — 86003 ALLG SPEC IGE CRUDE XTRC EA: CPT

## 2022-02-10 PROCEDURE — 36415 COLL VENOUS BLD VENIPUNCTURE: CPT

## 2022-02-14 LAB
CLAM IGE QN: <0.1 KUA/L (ref ?–0.1)
CODFISH IGE QN: <0.1 KUA/L (ref ?–0.1)
CORN IGE QN: <0.1 KUA/L (ref ?–0.1)
COW MILK IGE QN: <0.1 KUA/L (ref ?–0.1)
EGG WHITE IGE QN: <0.1 KUA/L (ref ?–0.1)
IGE SERPL-ACNC: 11.8 KU/L (ref 2–214)
PEANUT IGE QN: <0.1 KUA/L (ref ?–0.1)
SCALLOP IGE QN: <0.1 KUA/L (ref ?–0.1)
SESAME SEED IGE QN: <0.1 KUA/L (ref ?–0.1)
SHRIMP IGE QN: <0.1 KUA/L (ref ?–0.1)
SOYBEAN IGE QN: <0.1 KUA/L (ref ?–0.1)
WALNUT IGE QN: <0.1 KUA/L (ref ?–0.1)
WHEAT IGE QN: <0.1 KUA/L (ref ?–0.1)

## 2022-02-16 ENCOUNTER — NURSE ONLY (OUTPATIENT)
Dept: ALLERGY | Facility: CLINIC | Age: 47
End: 2022-02-16
Payer: COMMERCIAL

## 2022-02-16 DIAGNOSIS — J30.89 ENVIRONMENTAL AND SEASONAL ALLERGIES: ICD-10-CM

## 2022-02-16 PROCEDURE — 95117 IMMUNOTHERAPY INJECTIONS: CPT | Performed by: ALLERGY & IMMUNOLOGY

## 2022-02-23 ENCOUNTER — NURSE ONLY (OUTPATIENT)
Dept: ALLERGY | Facility: CLINIC | Age: 47
End: 2022-02-23
Payer: COMMERCIAL

## 2022-02-23 DIAGNOSIS — J30.89 ENVIRONMENTAL AND SEASONAL ALLERGIES: ICD-10-CM

## 2022-02-23 PROCEDURE — 95165 ANTIGEN THERAPY SERVICES: CPT | Performed by: ALLERGY & IMMUNOLOGY

## 2022-02-23 PROCEDURE — 95117 IMMUNOTHERAPY INJECTIONS: CPT | Performed by: ALLERGY & IMMUNOLOGY

## 2022-02-25 ENCOUNTER — PATIENT MESSAGE (OUTPATIENT)
Dept: GASTROENTEROLOGY | Facility: CLINIC | Age: 47
End: 2022-02-25

## 2022-03-02 ENCOUNTER — NURSE ONLY (OUTPATIENT)
Dept: ALLERGY | Facility: CLINIC | Age: 47
End: 2022-03-02
Payer: COMMERCIAL

## 2022-03-02 DIAGNOSIS — J30.89 ENVIRONMENTAL AND SEASONAL ALLERGIES: ICD-10-CM

## 2022-03-02 PROCEDURE — 95165 ANTIGEN THERAPY SERVICES: CPT | Performed by: ALLERGY & IMMUNOLOGY

## 2022-03-02 PROCEDURE — 95115 IMMUNOTHERAPY ONE INJECTION: CPT | Performed by: ALLERGY & IMMUNOLOGY

## 2022-03-03 NOTE — TELEPHONE ENCOUNTER
Noted patient message. To: [facility]      From: Opal Sandy      Created: 3/3/2022 10:42 AM        Thanks for your follow up. I am already working  with an allergist. I will call you to make an appointment.

## 2022-03-09 ENCOUNTER — NURSE ONLY (OUTPATIENT)
Dept: ALLERGY | Facility: CLINIC | Age: 47
End: 2022-03-09
Payer: COMMERCIAL

## 2022-03-09 DIAGNOSIS — J30.89 ENVIRONMENTAL AND SEASONAL ALLERGIES: ICD-10-CM

## 2022-03-09 PROCEDURE — 95115 IMMUNOTHERAPY ONE INJECTION: CPT | Performed by: ALLERGY & IMMUNOLOGY

## 2022-03-16 ENCOUNTER — NURSE ONLY (OUTPATIENT)
Dept: ALLERGY | Facility: CLINIC | Age: 47
End: 2022-03-16
Payer: COMMERCIAL

## 2022-03-16 DIAGNOSIS — J30.89 ENVIRONMENTAL AND SEASONAL ALLERGIES: ICD-10-CM

## 2022-03-16 PROCEDURE — 95115 IMMUNOTHERAPY ONE INJECTION: CPT | Performed by: ALLERGY & IMMUNOLOGY

## 2022-03-23 ENCOUNTER — NURSE ONLY (OUTPATIENT)
Dept: ALLERGY | Facility: CLINIC | Age: 47
End: 2022-03-23
Payer: COMMERCIAL

## 2022-03-23 DIAGNOSIS — J30.89 ENVIRONMENTAL AND SEASONAL ALLERGIES: ICD-10-CM

## 2022-03-23 PROCEDURE — 95117 IMMUNOTHERAPY INJECTIONS: CPT | Performed by: ALLERGY & IMMUNOLOGY

## 2022-03-30 ENCOUNTER — NURSE ONLY (OUTPATIENT)
Dept: ALLERGY | Facility: CLINIC | Age: 47
End: 2022-03-30
Payer: COMMERCIAL

## 2022-03-30 DIAGNOSIS — J30.89 ENVIRONMENTAL AND SEASONAL ALLERGIES: ICD-10-CM

## 2022-03-30 PROCEDURE — 95117 IMMUNOTHERAPY INJECTIONS: CPT | Performed by: ALLERGY & IMMUNOLOGY

## 2022-04-07 ENCOUNTER — NURSE ONLY (OUTPATIENT)
Dept: ALLERGY | Facility: CLINIC | Age: 47
End: 2022-04-07
Payer: COMMERCIAL

## 2022-04-07 DIAGNOSIS — J30.89 ENVIRONMENTAL AND SEASONAL ALLERGIES: ICD-10-CM

## 2022-04-07 PROCEDURE — 95117 IMMUNOTHERAPY INJECTIONS: CPT | Performed by: ALLERGY & IMMUNOLOGY

## 2022-04-13 ENCOUNTER — NURSE ONLY (OUTPATIENT)
Dept: ALLERGY | Facility: CLINIC | Age: 47
End: 2022-04-13
Payer: COMMERCIAL

## 2022-04-13 DIAGNOSIS — J30.89 ENVIRONMENTAL AND SEASONAL ALLERGIES: ICD-10-CM

## 2022-04-13 PROCEDURE — 95165 ANTIGEN THERAPY SERVICES: CPT | Performed by: ALLERGY & IMMUNOLOGY

## 2022-04-13 PROCEDURE — 95117 IMMUNOTHERAPY INJECTIONS: CPT | Performed by: ALLERGY & IMMUNOLOGY

## 2022-04-20 ENCOUNTER — NURSE ONLY (OUTPATIENT)
Dept: ALLERGY | Facility: CLINIC | Age: 47
End: 2022-04-20
Payer: COMMERCIAL

## 2022-04-20 DIAGNOSIS — J30.89 ENVIRONMENTAL AND SEASONAL ALLERGIES: ICD-10-CM

## 2022-04-20 PROCEDURE — 95165 ANTIGEN THERAPY SERVICES: CPT | Performed by: ALLERGY & IMMUNOLOGY

## 2022-04-20 PROCEDURE — 95117 IMMUNOTHERAPY INJECTIONS: CPT | Performed by: ALLERGY & IMMUNOLOGY

## 2022-04-27 ENCOUNTER — NURSE ONLY (OUTPATIENT)
Dept: ALLERGY | Facility: CLINIC | Age: 47
End: 2022-04-27
Payer: COMMERCIAL

## 2022-04-27 DIAGNOSIS — J30.89 ENVIRONMENTAL AND SEASONAL ALLERGIES: ICD-10-CM

## 2022-05-04 ENCOUNTER — TELEPHONE (OUTPATIENT)
Dept: ALLERGY | Facility: CLINIC | Age: 47
End: 2022-05-04

## 2022-05-04 ENCOUNTER — NURSE ONLY (OUTPATIENT)
Dept: ALLERGY | Facility: CLINIC | Age: 47
End: 2022-05-04
Payer: COMMERCIAL

## 2022-05-04 DIAGNOSIS — J30.89 ENVIRONMENTAL AND SEASONAL ALLERGIES: ICD-10-CM

## 2022-05-04 PROCEDURE — 95117 IMMUNOTHERAPY INJECTIONS: CPT | Performed by: ALLERGY & IMMUNOLOGY

## 2022-05-04 NOTE — TELEPHONE ENCOUNTER
Pt calling because she is unsure if she has missed her time slot for her next allergy shot. Not sure if she should be coming in this week and requesting confirmation. Please advise.

## 2022-05-04 NOTE — TELEPHONE ENCOUNTER
Spoke with patient. Verified name and . Informed patient she has until 22 to build on allergy shots. Patient scheduled for today, 22 at 1:20 pm. No further questions at this time.

## 2022-05-05 ENCOUNTER — TELEPHONE (OUTPATIENT)
Dept: ALLERGY | Facility: CLINIC | Age: 47
End: 2022-05-05

## 2022-05-06 RX ORDER — LEVOCETIRIZINE DIHYDROCHLORIDE 5 MG/1
5 TABLET, FILM COATED ORAL EVERY EVENING
Qty: 90 TABLET | Refills: 1 | Status: SHIPPED | OUTPATIENT
Start: 2022-05-06

## 2022-05-07 RX ORDER — AZELASTINE HYDROCHLORIDE, FLUTICASONE PROPIONATE 137; 50 UG/1; UG/1
1 SPRAY, METERED NASAL 2 TIMES DAILY
Qty: 3 EACH | Refills: 0 | Status: SHIPPED | OUTPATIENT
Start: 2022-05-07

## 2022-05-07 NOTE — TELEPHONE ENCOUNTER
Received refill request for Dymista nasal spray. Refill did not pass protocol. LOV 11/1/21, to return?

## 2022-05-07 NOTE — TELEPHONE ENCOUNTER
Left a message for patient  Dr. Sulema Segura has refilled her medication (Dymista nasal spray) and will need an appointment in November 2022.

## 2022-05-09 RX ORDER — FLUTICASONE PROPIONATE 50 MCG
2 SPRAY, SUSPENSION (ML) NASAL DAILY
Qty: 3 EACH | Refills: 0 | Status: SHIPPED | OUTPATIENT
Start: 2022-05-09

## 2022-05-09 RX ORDER — AZELASTINE 1 MG/ML
2 SPRAY, METERED NASAL DAILY
Qty: 3 EACH | Refills: 0 | Status: SHIPPED | OUTPATIENT
Start: 2022-05-09

## 2022-05-09 NOTE — TELEPHONE ENCOUNTER
[facility] stated insurance will not cover the following prescription and pharmacy would like to know what dr Melvin Garcia would like to do. Requesting a return call.      [address] [place] [address]    Ph. [phone]  Fax [phone]

## 2022-05-11 ENCOUNTER — LAB REQUISITION (OUTPATIENT)
Dept: LAB | Age: 47
End: 2022-05-11

## 2022-05-11 ENCOUNTER — NURSE ONLY (OUTPATIENT)
Dept: ALLERGY | Facility: CLINIC | Age: 47
End: 2022-05-11
Payer: COMMERCIAL

## 2022-05-11 DIAGNOSIS — Z79.899 OTHER LONG TERM (CURRENT) DRUG THERAPY: ICD-10-CM

## 2022-05-11 DIAGNOSIS — R51.9 HEADACHE, UNSPECIFIED: ICD-10-CM

## 2022-05-11 DIAGNOSIS — Z13.29 ENCOUNTER FOR SCREENING FOR OTHER SUSPECTED ENDOCRINE DISORDER: ICD-10-CM

## 2022-05-11 DIAGNOSIS — Z01.82 ENCOUNTER FOR ALLERGY TESTING: ICD-10-CM

## 2022-05-11 DIAGNOSIS — Z13.818 ENCOUNTER FOR SCREENING FOR OTHER DIGESTIVE SYSTEM DISORDERS: ICD-10-CM

## 2022-05-11 DIAGNOSIS — R15.2 FECAL URGENCY: ICD-10-CM

## 2022-05-11 DIAGNOSIS — R19.7 DIARRHEA, UNSPECIFIED: ICD-10-CM

## 2022-05-11 DIAGNOSIS — J30.89 ENVIRONMENTAL AND SEASONAL ALLERGIES: ICD-10-CM

## 2022-05-11 PROCEDURE — 95117 IMMUNOTHERAPY INJECTIONS: CPT | Performed by: ALLERGY & IMMUNOLOGY

## 2022-05-17 PROCEDURE — PSEU8202 INSULIN: Performed by: CLINICAL MEDICAL LABORATORY

## 2022-05-17 PROCEDURE — PSEU8135 LIPID PANEL WITH REFLEX: Performed by: CLINICAL MEDICAL LABORATORY

## 2022-05-17 PROCEDURE — 82728 ASSAY OF FERRITIN: CPT | Performed by: CLINICAL MEDICAL LABORATORY

## 2022-05-17 PROCEDURE — 84466 ASSAY OF TRANSFERRIN: CPT | Performed by: CLINICAL MEDICAL LABORATORY

## 2022-05-17 PROCEDURE — 83615 LACTATE (LD) (LDH) ENZYME: CPT | Performed by: CLINICAL MEDICAL LABORATORY

## 2022-05-17 PROCEDURE — 85652 RBC SED RATE AUTOMATED: CPT | Performed by: CLINICAL MEDICAL LABORATORY

## 2022-05-17 PROCEDURE — 83525 ASSAY OF INSULIN: CPT | Performed by: CLINICAL MEDICAL LABORATORY

## 2022-05-17 PROCEDURE — 84630 ASSAY OF ZINC: CPT | Performed by: CLINICAL MEDICAL LABORATORY

## 2022-05-17 PROCEDURE — PSEU8381 SEDIMENTATION RATE WESTERGREN: Performed by: CLINICAL MEDICAL LABORATORY

## 2022-05-17 PROCEDURE — PSEU9664 MAGNESIUM, RBC: Performed by: CLINICAL MEDICAL LABORATORY

## 2022-05-17 PROCEDURE — 82306 VITAMIN D 25 HYDROXY: CPT | Performed by: CLINICAL MEDICAL LABORATORY

## 2022-05-17 PROCEDURE — 85027 COMPLETE CBC AUTOMATED: CPT | Performed by: CLINICAL MEDICAL LABORATORY

## 2022-05-17 PROCEDURE — 83088 ASSAY OF HISTAMINE: CPT | Performed by: CLINICAL MEDICAL LABORATORY

## 2022-05-17 PROCEDURE — 84550 ASSAY OF BLOOD/URIC ACID: CPT | Performed by: CLINICAL MEDICAL LABORATORY

## 2022-05-17 PROCEDURE — PSEU8562 TRANSFERRIN: Performed by: CLINICAL MEDICAL LABORATORY

## 2022-05-17 PROCEDURE — 82607 VITAMIN B-12: CPT | Performed by: CLINICAL MEDICAL LABORATORY

## 2022-05-17 PROCEDURE — 86140 C-REACTIVE PROTEIN: CPT | Performed by: CLINICAL MEDICAL LABORATORY

## 2022-05-17 PROCEDURE — 83036 HEMOGLOBIN GLYCOSYLATED A1C: CPT | Performed by: CLINICAL MEDICAL LABORATORY

## 2022-05-17 PROCEDURE — PSEU8203 IRON AND TOTAL IRON BINDING CAPACITY: Performed by: CLINICAL MEDICAL LABORATORY

## 2022-05-17 PROCEDURE — PSEU9899 ZINC, RBC: Performed by: CLINICAL MEDICAL LABORATORY

## 2022-05-17 PROCEDURE — PSEU9421 CANDIDA ALBICANS ANTIBODIES, IGA, IGG, AND IGM: Performed by: CLINICAL MEDICAL LABORATORY

## 2022-05-17 PROCEDURE — PSEU8306 VITAMIN B12: Performed by: CLINICAL MEDICAL LABORATORY

## 2022-05-17 PROCEDURE — PSEU8229 VITAMIN D -25 HYDROXY: Performed by: CLINICAL MEDICAL LABORATORY

## 2022-05-17 PROCEDURE — 83735 ASSAY OF MAGNESIUM: CPT | Performed by: CLINICAL MEDICAL LABORATORY

## 2022-05-17 PROCEDURE — PSEU8181 C REACTIVE PROTEIN: Performed by: CLINICAL MEDICAL LABORATORY

## 2022-05-17 PROCEDURE — PSEU8259 FERRITIN: Performed by: CLINICAL MEDICAL LABORATORY

## 2022-05-17 PROCEDURE — PSEU8266 GLYCOHEMOGLOBIN: Performed by: CLINICAL MEDICAL LABORATORY

## 2022-05-17 PROCEDURE — 82785 ASSAY OF IGE: CPT | Performed by: CLINICAL MEDICAL LABORATORY

## 2022-05-17 PROCEDURE — PSEU8303 URIC ACID: Performed by: CLINICAL MEDICAL LABORATORY

## 2022-05-17 PROCEDURE — PSEU8536 IMMUNOGLOBULIN E: Performed by: CLINICAL MEDICAL LABORATORY

## 2022-05-17 PROCEDURE — PSEU8260 FOLATE: Performed by: CLINICAL MEDICAL LABORATORY

## 2022-05-17 PROCEDURE — PSEU8250 COMPREHENSIVE METABOLIC PANEL: Performed by: CLINICAL MEDICAL LABORATORY

## 2022-05-17 PROCEDURE — 83540 ASSAY OF IRON: CPT | Performed by: CLINICAL MEDICAL LABORATORY

## 2022-05-17 PROCEDURE — PSEU9585 HISTAMINE: Performed by: CLINICAL MEDICAL LABORATORY

## 2022-05-17 PROCEDURE — 80053 COMPREHEN METABOLIC PANEL: CPT | Performed by: CLINICAL MEDICAL LABORATORY

## 2022-05-17 PROCEDURE — 86628 CANDIDA ANTIBODY: CPT | Performed by: CLINICAL MEDICAL LABORATORY

## 2022-05-17 PROCEDURE — 82746 ASSAY OF FOLIC ACID SERUM: CPT | Performed by: CLINICAL MEDICAL LABORATORY

## 2022-05-17 PROCEDURE — 80061 LIPID PANEL: CPT | Performed by: CLINICAL MEDICAL LABORATORY

## 2022-05-17 PROCEDURE — PSEU8268 LACTATE DEHYDROGENASE: Performed by: CLINICAL MEDICAL LABORATORY

## 2022-05-18 ENCOUNTER — OFFICE VISIT (OUTPATIENT)
Dept: INTERNAL MEDICINE CLINIC | Facility: CLINIC | Age: 47
End: 2022-05-18
Payer: COMMERCIAL

## 2022-05-18 VITALS
TEMPERATURE: 98 F | WEIGHT: 154 LBS | HEIGHT: 64 IN | OXYGEN SATURATION: 99 % | SYSTOLIC BLOOD PRESSURE: 98 MMHG | HEART RATE: 94 BPM | DIASTOLIC BLOOD PRESSURE: 62 MMHG | BODY MASS INDEX: 26.29 KG/M2

## 2022-05-18 DIAGNOSIS — Z00.00 ANNUAL PHYSICAL EXAM: Primary | ICD-10-CM

## 2022-05-18 DIAGNOSIS — Z12.31 ENCOUNTER FOR SCREENING MAMMOGRAM FOR MALIGNANT NEOPLASM OF BREAST: ICD-10-CM

## 2022-05-18 LAB
25(OH)D3+25(OH)D2 SERPL-MCNC: 32.1 NG/ML (ref 30–100)
ALBUMIN SERPL-MCNC: 3.4 G/DL (ref 3.4–5)
ALBUMIN SERPL-MCNC: 3.4 G/DL (ref 3.6–5.1)
ALBUMIN/GLOB SERPL: 0.8 {RATIO} (ref 1–2)
ALBUMIN/GLOB SERPL: 0.9 {RATIO} (ref 1–2.4)
ALP LIVER SERPL-CCNC: 66 U/L
ALP SERPL-CCNC: 68 UNITS/L (ref 45–117)
ALT SERPL-CCNC: 81 U/L
ALT SERPL-CCNC: 83 UNITS/L
ANION GAP SERPL CALC-SCNC: 10 MMOL/L (ref 10–20)
ANION GAP SERPL CALC-SCNC: 6 MMOL/L (ref 0–18)
AST SERPL-CCNC: 38 UNITS/L
AST SERPL-CCNC: 42 U/L (ref 15–37)
BASOPHILS # BLD: 0 K/MCL (ref 0–0.3)
BASOPHILS NFR BLD: 0 %
BILIRUB SERPL-MCNC: 0.4 MG/DL (ref 0.1–2)
BILIRUB SERPL-MCNC: 0.5 MG/DL (ref 0.2–1)
BUN BLD-MCNC: 11 MG/DL (ref 7–18)
BUN SERPL-MCNC: 10 MG/DL (ref 6–20)
BUN/CREAT SERPL: 16.4 (ref 10–20)
BUN/CREAT SERPL: 17 (ref 7–25)
CALCIUM BLD-MCNC: 8.4 MG/DL (ref 8.5–10.1)
CALCIUM SERPL-MCNC: 8.6 MG/DL (ref 8.4–10.2)
CHLORIDE SERPL-SCNC: 107 MMOL/L (ref 98–112)
CHLORIDE SERPL-SCNC: 114 MMOL/L (ref 98–107)
CHOLEST SERPL-MCNC: 149 MG/DL
CHOLEST SERPL-MCNC: 149 MG/DL (ref ?–200)
CHOLEST/HDLC SERPL: 4.7 {RATIO}
CO2 SERPL-SCNC: 24 MMOL/L (ref 21–32)
CO2 SERPL-SCNC: 28 MMOL/L (ref 21–32)
CREAT BLD-MCNC: 0.67 MG/DL
CREAT SERPL-MCNC: 0.6 MG/DL (ref 0.51–0.95)
CRP SERPL-MCNC: 0.5 MG/DL
DEPRECATED RDW RBC: 42.5 FL (ref 39–50)
EOSINOPHIL # BLD: 0.2 K/MCL (ref 0–0.5)
EOSINOPHIL NFR BLD: 2 %
ERYTHROCYTE [DISTWIDTH] IN BLOOD: 14.2 % (ref 11–15)
ERYTHROCYTE [SEDIMENTATION RATE] IN BLOOD BY WESTERGREN METHOD: 15 MM/HR (ref 0–20)
FASTING DURATION TIME PATIENT: 8 HOURS (ref 0–999)
FASTING DURATION TIME PATIENT: 8 HOURS (ref 0–999)
FASTING PATIENT LIPID ANSWER: NO
FASTING STATUS PATIENT QL REPORTED: NO
FERRITIN SERPL-MCNC: 28 NG/ML (ref 8–252)
FOLATE SERPL-MCNC: 8.4 NG/ML
GFR SERPLBLD BASED ON 1.73 SQ M-ARVRAT: >90 ML/MIN
GLOBULIN PLAS-MCNC: 4.3 G/DL (ref 2.8–4.4)
GLOBULIN SER-MCNC: 3.8 G/DL (ref 2–4)
GLUCOSE BLD-MCNC: 87 MG/DL (ref 70–99)
GLUCOSE SERPL-MCNC: 96 MG/DL (ref 70–99)
HBA1C MFR BLD: 5.7 % (ref 4.5–5.6)
HCT VFR BLD CALC: 41.9 % (ref 36–46.5)
HDLC SERPL-MCNC: 32 MG/DL
HDLC SERPL-MCNC: 34 MG/DL (ref 40–59)
HGB BLD-MCNC: 13.4 G/DL (ref 12–15.5)
IGE SERPL-ACNC: 38.1 IUNITS/ML
IRON SATN MFR SERPL: 18 % (ref 15–45)
IRON SERPL-MCNC: 65 MCG/DL (ref 50–170)
LDH SERPL L TO P-CCNC: 237 UNITS/L (ref 82–240)
LDLC SERPL CALC-MCNC: 91 MG/DL
LDLC SERPL CALC-MCNC: 97 MG/DL (ref ?–100)
LYMPHOCYTES # BLD: 5.5 K/MCL (ref 1–4.8)
LYMPHOCYTES NFR BLD: 58 %
MCH RBC QN AUTO: 26.5 PG (ref 26–34)
MCHC RBC AUTO-ENTMCNC: 32 G/DL (ref 32–36.5)
MCV RBC AUTO: 82.8 FL (ref 78–100)
MONOCYTES # BLD: 0.7 K/MCL (ref 0.3–0.9)
MONOCYTES NFR BLD: 8 %
NEUTROPHILS # BLD: 1.9 K/MCL (ref 1.8–7.7)
NEUTS SEG NFR BLD: 23 %
NONHDLC SERPL-MCNC: 115 MG/DL (ref ?–130)
NONHDLC SERPL-MCNC: 117 MG/DL
NRBC BLD MANUAL-RTO: 0 /100 WBC
OSMOLALITY SERPL CALC.SUM OF ELEC: 291 MOSM/KG (ref 275–295)
PLAT MORPH BLD: NORMAL
PLATELET # BLD AUTO: 311 K/MCL (ref 140–450)
POTASSIUM SERPL-SCNC: 3.9 MMOL/L (ref 3.5–5.1)
POTASSIUM SERPL-SCNC: 4 MMOL/L (ref 3.4–5.1)
PROT SERPL-MCNC: 7.2 G/DL (ref 6.4–8.2)
PROT SERPL-MCNC: 7.7 G/DL (ref 6.4–8.2)
RBC # BLD: 5.06 MIL/MCL (ref 4–5.2)
RBC MORPH BLD: NORMAL
SODIUM SERPL-SCNC: 141 MMOL/L (ref 136–145)
SODIUM SERPL-SCNC: 144 MMOL/L (ref 135–145)
TIBC SERPL-MCNC: 353 MCG/DL (ref 250–450)
TRANSFERRIN SERPL-MCNC: 287 MG/DL (ref 200–360)
TRIGL SERPL-MCNC: 132 MG/DL
TRIGL SERPL-MCNC: 98 MG/DL (ref 30–149)
TSI SER-ACNC: 1.23 MIU/ML (ref 0.36–3.74)
URATE SERPL-MCNC: 4.5 MG/DL (ref 2.6–5.9)
VARIANT LYMPHS NFR BLD: 9 % (ref 0–5)
VIT B12 SERPL-MCNC: 563 PG/ML (ref 211–911)
VLDLC SERPL CALC-MCNC: 16 MG/DL (ref 0–30)
WBC # BLD: 8.2 K/MCL (ref 4.2–11)
WBC MORPH BLD: ABNORMAL

## 2022-05-18 PROCEDURE — 3008F BODY MASS INDEX DOCD: CPT | Performed by: INTERNAL MEDICINE

## 2022-05-18 PROCEDURE — 3074F SYST BP LT 130 MM HG: CPT | Performed by: INTERNAL MEDICINE

## 2022-05-18 PROCEDURE — 36415 COLL VENOUS BLD VENIPUNCTURE: CPT | Performed by: INTERNAL MEDICINE

## 2022-05-18 PROCEDURE — 3078F DIAST BP <80 MM HG: CPT | Performed by: INTERNAL MEDICINE

## 2022-05-18 PROCEDURE — 99396 PREV VISIT EST AGE 40-64: CPT | Performed by: INTERNAL MEDICINE

## 2022-05-19 ENCOUNTER — NURSE ONLY (OUTPATIENT)
Dept: ALLERGY | Facility: CLINIC | Age: 47
End: 2022-05-19
Payer: COMMERCIAL

## 2022-05-19 DIAGNOSIS — J30.89 ENVIRONMENTAL AND SEASONAL ALLERGIES: ICD-10-CM

## 2022-05-19 LAB
BASOPHILS # BLD: 0 X10(3) UL (ref 0–0.2)
BASOPHILS NFR BLD: 0 %
DEPRECATED RDW RBC AUTO: 43.2 FL (ref 35.1–46.3)
EOSINOPHIL # BLD: 0 X10(3) UL (ref 0–0.7)
EOSINOPHIL NFR BLD: 0 %
ERYTHROCYTE [DISTWIDTH] IN BLOOD BY AUTOMATED COUNT: 14 % (ref 11–15)
HCT VFR BLD AUTO: 44.2 %
HGB BLD-MCNC: 13.6 G/DL
INSULIN SERPL-ACNC: 14 MUNITS/L
LYMPHOCYTES NFR BLD: 4.95 X10(3) UL (ref 1–4)
LYMPHOCYTES NFR BLD: 56 %
MCH RBC QN AUTO: 25.9 PG (ref 26–34)
MCHC RBC AUTO-ENTMCNC: 30.8 G/DL (ref 31–37)
MCV RBC AUTO: 84.2 FL
MONOCYTES # BLD: 0.23 X10(3) UL (ref 0.1–1)
MONOCYTES NFR BLD: 3 %
MORPHOLOGY: NORMAL
NEUTROPHILS # BLD AUTO: 2.07 X10 (3) UL (ref 1.5–7.7)
NEUTROPHILS NFR BLD: 31 %
NEUTS HYPERSEG # BLD: 2.33 X10(3) UL (ref 1.5–7.7)
PLATELET # BLD AUTO: 334 10(3)UL (ref 150–450)
PLATELET MORPHOLOGY: NORMAL
RBC # BLD AUTO: 5.25 X10(6)UL
TOTAL CELLS COUNTED BLD: 100
VARIANT LYMPHS NFR BLD MANUAL: 10 %
WBC # BLD AUTO: 7.5 X10(3) UL (ref 4–11)

## 2022-05-20 ENCOUNTER — TELEPHONE (OUTPATIENT)
Dept: INTERNAL MEDICINE CLINIC | Facility: CLINIC | Age: 47
End: 2022-05-20

## 2022-05-20 ENCOUNTER — HOSPITAL ENCOUNTER (OUTPATIENT)
Dept: MAMMOGRAPHY | Facility: HOSPITAL | Age: 47
Discharge: HOME | End: 2022-05-20
Attending: INTERNAL MEDICINE
Payer: COMMERCIAL

## 2022-05-20 DIAGNOSIS — Z12.31 ENCOUNTER FOR SCREENING MAMMOGRAM FOR MALIGNANT NEOPLASM OF BREAST: ICD-10-CM

## 2022-05-20 LAB — HISTAMINE SERPL-SCNC: 74 NMOL/L (ref 0–8)

## 2022-05-20 PROCEDURE — 77067 SCR MAMMO BI INCL CAD: CPT | Performed by: INTERNAL MEDICINE

## 2022-05-20 PROCEDURE — 77063 BREAST TOMOSYNTHESIS BI: CPT | Performed by: INTERNAL MEDICINE

## 2022-05-20 NOTE — TELEPHONE ENCOUNTER
I saw her labs, who is this doctor? Her her liver enzymes are high and she is prediabetic. I did put the order for ultrasound  and we need repeat ? Is this gi doctor ?

## 2022-05-20 NOTE — PROGRESS NOTES
Pt informed mammogram is normal and to repeat in one year, also pt states she had labs done by another doctor and her results are in her mychart, she wanted to know if you can access those results and let her know if everything is ok.

## 2022-05-20 NOTE — TELEPHONE ENCOUNTER
Dr. Faisal Benitez - she says Dr. Elsa Fiore is a Doctor of Chiropractic, She sees him for functional medicine. RN called patient. Patient's date of birth and full name both confirmed. RN informed patient of provider's message as detailed below. She verbalizes understanding of all information, and agreeable to plan. Will have Ultrasound and lab work done, aware to fast prior to bloodwork but stay hydrated with plain water.

## 2022-05-21 LAB
MAGNESIUM RBC-SCNC: 6 MG/DL (ref 3.6–7.5)
ZINC RBC-MCNC: 1075.6 MCG/DL (ref 794–1470)

## 2022-05-24 ENCOUNTER — NURSE ONLY (OUTPATIENT)
Dept: ALLERGY | Facility: CLINIC | Age: 47
End: 2022-05-24
Payer: COMMERCIAL

## 2022-05-24 DIAGNOSIS — J30.89 ENVIRONMENTAL AND SEASONAL ALLERGIES: ICD-10-CM

## 2022-05-24 PROCEDURE — 95165 ANTIGEN THERAPY SERVICES: CPT | Performed by: ALLERGY & IMMUNOLOGY

## 2022-05-24 PROCEDURE — 95117 IMMUNOTHERAPY INJECTIONS: CPT | Performed by: ALLERGY & IMMUNOLOGY

## 2022-05-25 LAB
C ALBICANS IGA SER-ACNC: 0.13 EV
C ALBICANS IGG QN: 0.14 EV
C ALBICANS IGM SER-ACNC: 0.21 EV

## 2022-06-01 ENCOUNTER — LAB ENCOUNTER (OUTPATIENT)
Dept: LAB | Age: 47
End: 2022-06-01
Attending: INTERNAL MEDICINE
Payer: COMMERCIAL

## 2022-06-01 ENCOUNTER — NURSE ONLY (OUTPATIENT)
Dept: ALLERGY | Facility: CLINIC | Age: 47
End: 2022-06-01
Payer: COMMERCIAL

## 2022-06-01 DIAGNOSIS — J30.89 ENVIRONMENTAL AND SEASONAL ALLERGIES: ICD-10-CM

## 2022-06-01 DIAGNOSIS — D72.820 LYMPHOCYTOSIS: ICD-10-CM

## 2022-06-01 LAB
BASOPHILS # BLD AUTO: 0.05 X10(3) UL (ref 0–0.2)
BASOPHILS NFR BLD AUTO: 0.6 %
DEPRECATED RDW RBC AUTO: 40.6 FL (ref 35.1–46.3)
EOSINOPHIL # BLD AUTO: 0.09 X10(3) UL (ref 0–0.7)
EOSINOPHIL NFR BLD AUTO: 1.1 %
ERYTHROCYTE [DISTWIDTH] IN BLOOD BY AUTOMATED COUNT: 13.8 % (ref 11–15)
HCT VFR BLD AUTO: 40 %
HGB BLD-MCNC: 12.9 G/DL
IMM GRANULOCYTES # BLD AUTO: 0.01 X10(3) UL (ref 0–1)
IMM GRANULOCYTES NFR BLD: 0.1 %
LYMPHOCYTES # BLD AUTO: 4.41 X10(3) UL (ref 1–4)
LYMPHOCYTES NFR BLD AUTO: 55.5 %
MCH RBC QN AUTO: 26.5 PG (ref 26–34)
MCHC RBC AUTO-ENTMCNC: 32.3 G/DL (ref 31–37)
MCV RBC AUTO: 82.3 FL
MONOCYTES # BLD AUTO: 0.44 X10(3) UL (ref 0.1–1)
MONOCYTES NFR BLD AUTO: 5.5 %
NEUTROPHILS # BLD AUTO: 2.94 X10 (3) UL (ref 1.5–7.7)
NEUTROPHILS # BLD AUTO: 2.94 X10(3) UL (ref 1.5–7.7)
NEUTROPHILS NFR BLD AUTO: 37.2 %
PLATELET # BLD AUTO: 271 10(3)UL (ref 150–450)
RBC # BLD AUTO: 4.86 X10(6)UL
WBC # BLD AUTO: 7.9 X10(3) UL (ref 4–11)

## 2022-06-01 PROCEDURE — 85025 COMPLETE CBC W/AUTO DIFF WBC: CPT

## 2022-06-01 PROCEDURE — 95117 IMMUNOTHERAPY INJECTIONS: CPT | Performed by: ALLERGY & IMMUNOLOGY

## 2022-06-01 PROCEDURE — 36415 COLL VENOUS BLD VENIPUNCTURE: CPT

## 2022-06-03 ENCOUNTER — LAB ENCOUNTER (OUTPATIENT)
Dept: LAB | Age: 47
End: 2022-06-03
Attending: INTERNAL MEDICINE
Payer: COMMERCIAL

## 2022-06-03 DIAGNOSIS — J02.9 SORE THROAT: ICD-10-CM

## 2022-06-03 DIAGNOSIS — J06.9 URI, ACUTE: ICD-10-CM

## 2022-06-03 PROCEDURE — 87081 CULTURE SCREEN ONLY: CPT

## 2022-06-04 LAB — SARS-COV-2 RNA RESP QL NAA+PROBE: NOT DETECTED

## 2022-06-08 ENCOUNTER — NURSE ONLY (OUTPATIENT)
Dept: ALLERGY | Facility: CLINIC | Age: 47
End: 2022-06-08
Payer: COMMERCIAL

## 2022-06-08 DIAGNOSIS — J30.89 ENVIRONMENTAL AND SEASONAL ALLERGIES: ICD-10-CM

## 2022-06-08 PROCEDURE — 95117 IMMUNOTHERAPY INJECTIONS: CPT | Performed by: ALLERGY & IMMUNOLOGY

## 2022-06-13 ENCOUNTER — HOSPITAL ENCOUNTER (OUTPATIENT)
Dept: ULTRASOUND IMAGING | Age: 47
Discharge: HOME | End: 2022-06-13
Attending: INTERNAL MEDICINE
Payer: COMMERCIAL

## 2022-06-13 DIAGNOSIS — R79.89 ELEVATED LFTS: ICD-10-CM

## 2022-06-13 PROCEDURE — 76705 ECHO EXAM OF ABDOMEN: CPT | Performed by: INTERNAL MEDICINE

## 2022-06-15 ENCOUNTER — NURSE ONLY (OUTPATIENT)
Dept: ALLERGY | Facility: CLINIC | Age: 47
End: 2022-06-15
Payer: COMMERCIAL

## 2022-06-15 DIAGNOSIS — J30.89 ENVIRONMENTAL AND SEASONAL ALLERGIES: ICD-10-CM

## 2022-06-15 PROCEDURE — 95115 IMMUNOTHERAPY ONE INJECTION: CPT | Performed by: ALLERGY & IMMUNOLOGY

## 2022-06-21 ENCOUNTER — LAB REQUISITION (OUTPATIENT)
Dept: LAB | Age: 47
End: 2022-06-21

## 2022-06-21 DIAGNOSIS — R15.2 FECAL URGENCY: ICD-10-CM

## 2022-06-21 DIAGNOSIS — R77.0 ABNORMALITY OF ALBUMIN: ICD-10-CM

## 2022-06-21 DIAGNOSIS — Z79.899 OTHER LONG TERM (CURRENT) DRUG THERAPY: ICD-10-CM

## 2022-06-21 DIAGNOSIS — R19.7 DIARRHEA, UNSPECIFIED: ICD-10-CM

## 2022-06-21 DIAGNOSIS — R73.03 PREDIABETES: ICD-10-CM

## 2022-06-21 DIAGNOSIS — R74.01 ELEVATION OF LEVELS OF LIVER TRANSAMINASE LEVELS: ICD-10-CM

## 2022-06-21 DIAGNOSIS — D72.820 LYMPHOCYTOSIS (SYMPTOMATIC): ICD-10-CM

## 2022-06-21 DIAGNOSIS — R51.9 HEADACHE, UNSPECIFIED: ICD-10-CM

## 2022-06-21 PROCEDURE — PSEU8528 HEPATITIS C ANTIBODY WITH REFLEX: Performed by: CLINICAL MEDICAL LABORATORY

## 2022-06-21 PROCEDURE — PSEU8267 HEPATIC FUNCTION PANEL: Performed by: CLINICAL MEDICAL LABORATORY

## 2022-06-21 PROCEDURE — 80076 HEPATIC FUNCTION PANEL: CPT | Performed by: CLINICAL MEDICAL LABORATORY

## 2022-06-21 PROCEDURE — 82784 ASSAY IGA/IGD/IGG/IGM EACH: CPT | Performed by: CLINICAL MEDICAL LABORATORY

## 2022-06-21 PROCEDURE — 85060 BLOOD SMEAR INTERPRETATION: CPT | Performed by: CLINICAL MEDICAL LABORATORY

## 2022-06-21 PROCEDURE — 85027 COMPLETE CBC AUTOMATED: CPT | Performed by: CLINICAL MEDICAL LABORATORY

## 2022-06-21 PROCEDURE — PSEU8538 IMMUNOGLOBULINS QUANTITATIVE: Performed by: CLINICAL MEDICAL LABORATORY

## 2022-06-21 PROCEDURE — 86803 HEPATITIS C AB TEST: CPT | Performed by: CLINICAL MEDICAL LABORATORY

## 2022-06-22 LAB
ALBUMIN SERPL-MCNC: 3.7 G/DL (ref 3.6–5.1)
ALP SERPL-CCNC: 67 UNITS/L (ref 45–117)
ALT SERPL-CCNC: 36 UNITS/L
AST SERPL-CCNC: 24 UNITS/L
BASOPHILS # BLD: 0.1 K/MCL (ref 0–0.3)
BASOPHILS NFR BLD: 1 %
BILIRUB CONJ SERPL-MCNC: 0.2 MG/DL (ref 0–0.2)
BILIRUB SERPL-MCNC: 0.5 MG/DL (ref 0.2–1)
DEPRECATED RDW RBC: 44.7 FL (ref 39–50)
EOSINOPHIL # BLD: 0.1 K/MCL (ref 0–0.5)
EOSINOPHIL NFR BLD: 1 %
ERYTHROCYTE [DISTWIDTH] IN BLOOD: 14.4 % (ref 11–15)
HCT VFR BLD CALC: 43.3 % (ref 36–46.5)
HCV AB SER QL: NEGATIVE
HGB BLD-MCNC: 13.5 G/DL (ref 12–15.5)
IGA SERPL-MCNC: 249 MG/DL (ref 70–400)
IGG SERPL-MCNC: 1450 MG/DL (ref 700–1600)
IGM SERPL-MCNC: 91 MG/DL (ref 40–230)
LYMPHOCYTES # BLD: 3.4 K/MCL (ref 1–4.8)
LYMPHOCYTES NFR BLD: 53 %
MCH RBC QN AUTO: 26.6 PG (ref 26–34)
MCHC RBC AUTO-ENTMCNC: 31.2 G/DL (ref 32–36.5)
MCV RBC AUTO: 85.4 FL (ref 78–100)
MONOCYTES # BLD: 0.4 K/MCL (ref 0.3–0.9)
MONOCYTES NFR BLD: 6 %
NEUTROPHILS # BLD: 2.5 K/MCL (ref 1.8–7.7)
NEUTS SEG NFR BLD: 39 %
NRBC BLD MANUAL-RTO: 0 /100 WBC
PATH REV BLD -IMP: NO
PATH REV: NORMAL
PLATELET # BLD AUTO: 301 K/MCL (ref 140–450)
PROT SERPL-MCNC: 7.6 G/DL (ref 6.4–8.2)
RBC # BLD: 5.07 MIL/MCL (ref 4–5.2)
WBC # BLD: 6.5 K/MCL (ref 4.2–11)

## 2022-06-23 ENCOUNTER — NURSE ONLY (OUTPATIENT)
Dept: ALLERGY | Facility: CLINIC | Age: 47
End: 2022-06-23
Payer: COMMERCIAL

## 2022-06-23 DIAGNOSIS — J30.89 ENVIRONMENTAL AND SEASONAL ALLERGIES: ICD-10-CM

## 2022-06-23 PROCEDURE — 95165 ANTIGEN THERAPY SERVICES: CPT | Performed by: ALLERGY & IMMUNOLOGY

## 2022-06-23 PROCEDURE — 95117 IMMUNOTHERAPY INJECTIONS: CPT | Performed by: ALLERGY & IMMUNOLOGY

## 2022-06-28 ENCOUNTER — TELEMEDICINE (OUTPATIENT)
Dept: INTERNAL MEDICINE CLINIC | Facility: CLINIC | Age: 47
End: 2022-06-28
Payer: COMMERCIAL

## 2022-06-28 DIAGNOSIS — R09.89 CHEST CONGESTION: ICD-10-CM

## 2022-06-28 DIAGNOSIS — R05.9 COUGH: Primary | ICD-10-CM

## 2022-06-28 RX ORDER — MONTELUKAST SODIUM 10 MG/1
TABLET ORAL
Qty: 90 TABLET | Refills: 0 | Status: SHIPPED | OUTPATIENT
Start: 2022-06-28

## 2022-06-28 RX ORDER — AZITHROMYCIN 250 MG/1
TABLET, FILM COATED ORAL
Qty: 6 TABLET | Refills: 0 | Status: SHIPPED | OUTPATIENT
Start: 2022-06-28 | End: 2022-07-03

## 2022-06-28 RX ORDER — BENZONATATE 100 MG/1
100 CAPSULE ORAL 3 TIMES DAILY PRN
Qty: 20 CAPSULE | Refills: 0 | Status: SHIPPED | OUTPATIENT
Start: 2022-06-28 | End: 2022-07-05

## 2022-06-28 RX ORDER — MONTELUKAST SODIUM 10 MG/1
10 TABLET ORAL NIGHTLY
Qty: 30 TABLET | Refills: 0 | Status: SHIPPED | OUTPATIENT
Start: 2022-06-28 | End: 2022-06-28

## 2022-06-30 ENCOUNTER — TELEPHONE (OUTPATIENT)
Dept: ALLERGY | Facility: CLINIC | Age: 47
End: 2022-06-30

## 2022-06-30 NOTE — TELEPHONE ENCOUNTER
Spoke with patient. Verified name and . Informed patient our office is closed next week for allergy injections due to construction. Injection appointment scheduled for 22 at 8:10 am. No further questions at this time.

## 2022-07-13 ENCOUNTER — NURSE ONLY (OUTPATIENT)
Dept: ALLERGY | Facility: CLINIC | Age: 47
End: 2022-07-13
Payer: COMMERCIAL

## 2022-07-13 DIAGNOSIS — J30.89 ENVIRONMENTAL AND SEASONAL ALLERGIES: ICD-10-CM

## 2022-07-13 PROCEDURE — 95117 IMMUNOTHERAPY INJECTIONS: CPT | Performed by: ALLERGY & IMMUNOLOGY

## 2022-07-27 ENCOUNTER — NURSE ONLY (OUTPATIENT)
Dept: ALLERGY | Facility: CLINIC | Age: 47
End: 2022-07-27
Payer: COMMERCIAL

## 2022-07-27 DIAGNOSIS — J30.89 ENVIRONMENTAL AND SEASONAL ALLERGIES: ICD-10-CM

## 2022-07-27 PROCEDURE — 95117 IMMUNOTHERAPY INJECTIONS: CPT | Performed by: ALLERGY & IMMUNOLOGY

## 2022-08-01 ENCOUNTER — VIRTUAL PHONE E/M (OUTPATIENT)
Dept: ALLERGY | Facility: CLINIC | Age: 47
End: 2022-08-01
Payer: COMMERCIAL

## 2022-08-01 DIAGNOSIS — J30.89 PERENNIAL ALLERGIC RHINITIS WITH SEASONAL VARIATION: Primary | ICD-10-CM

## 2022-08-01 DIAGNOSIS — J34.2 NASAL SEPTAL DEVIATION: ICD-10-CM

## 2022-08-01 DIAGNOSIS — Z92.29 COVID-19 VACCINE SERIES COMPLETED: ICD-10-CM

## 2022-08-01 DIAGNOSIS — J30.2 PERENNIAL ALLERGIC RHINITIS WITH SEASONAL VARIATION: Primary | ICD-10-CM

## 2022-08-04 ENCOUNTER — NURSE ONLY (OUTPATIENT)
Dept: ALLERGY | Facility: CLINIC | Age: 47
End: 2022-08-04
Payer: COMMERCIAL

## 2022-08-04 DIAGNOSIS — J30.89 ENVIRONMENTAL AND SEASONAL ALLERGIES: ICD-10-CM

## 2022-08-04 PROCEDURE — 95117 IMMUNOTHERAPY INJECTIONS: CPT | Performed by: ALLERGY & IMMUNOLOGY

## 2022-08-05 ENCOUNTER — OFFICE VISIT (OUTPATIENT)
Dept: GASTROENTEROLOGY | Facility: CLINIC | Age: 47
End: 2022-08-05
Payer: COMMERCIAL

## 2022-08-05 VITALS
SYSTOLIC BLOOD PRESSURE: 99 MMHG | WEIGHT: 147 LBS | DIASTOLIC BLOOD PRESSURE: 72 MMHG | BODY MASS INDEX: 25.1 KG/M2 | HEIGHT: 64 IN

## 2022-08-05 DIAGNOSIS — K58.0 IRRITABLE BOWEL SYNDROME WITH DIARRHEA: ICD-10-CM

## 2022-08-05 DIAGNOSIS — R15.2 FECAL URGENCY: Primary | ICD-10-CM

## 2022-08-05 DIAGNOSIS — R19.5 LOOSE STOOLS: ICD-10-CM

## 2022-08-05 PROCEDURE — 3078F DIAST BP <80 MM HG: CPT | Performed by: INTERNAL MEDICINE

## 2022-08-05 PROCEDURE — 3074F SYST BP LT 130 MM HG: CPT | Performed by: INTERNAL MEDICINE

## 2022-08-05 PROCEDURE — 99215 OFFICE O/P EST HI 40 MIN: CPT | Performed by: INTERNAL MEDICINE

## 2022-08-05 PROCEDURE — 3008F BODY MASS INDEX DOCD: CPT | Performed by: INTERNAL MEDICINE

## 2022-08-05 RX ORDER — ALPRAZOLAM 0.25 MG/1
TABLET ORAL
COMMUNITY
Start: 2022-03-10

## 2022-08-17 ENCOUNTER — NURSE ONLY (OUTPATIENT)
Dept: ALLERGY | Facility: CLINIC | Age: 47
End: 2022-08-17
Payer: COMMERCIAL

## 2022-08-17 DIAGNOSIS — J30.89 ENVIRONMENTAL AND SEASONAL ALLERGIES: ICD-10-CM

## 2022-08-17 PROCEDURE — 95117 IMMUNOTHERAPY INJECTIONS: CPT | Performed by: ALLERGY & IMMUNOLOGY

## 2022-08-24 ENCOUNTER — NURSE ONLY (OUTPATIENT)
Dept: ALLERGY | Facility: CLINIC | Age: 47
End: 2022-08-24
Payer: COMMERCIAL

## 2022-08-24 DIAGNOSIS — J30.89 ENVIRONMENTAL AND SEASONAL ALLERGIES: ICD-10-CM

## 2022-08-24 PROCEDURE — 95117 IMMUNOTHERAPY INJECTIONS: CPT | Performed by: ALLERGY & IMMUNOLOGY

## 2022-08-24 PROCEDURE — 95165 ANTIGEN THERAPY SERVICES: CPT | Performed by: ALLERGY & IMMUNOLOGY

## 2022-09-12 ENCOUNTER — NURSE ONLY (OUTPATIENT)
Dept: ALLERGY | Facility: CLINIC | Age: 47
End: 2022-09-12
Payer: COMMERCIAL

## 2022-09-12 DIAGNOSIS — J30.89 ENVIRONMENTAL AND SEASONAL ALLERGIES: ICD-10-CM

## 2022-09-27 ENCOUNTER — NURSE ONLY (OUTPATIENT)
Dept: ALLERGY | Facility: CLINIC | Age: 47
End: 2022-09-27

## 2022-09-27 DIAGNOSIS — J30.89 ENVIRONMENTAL AND SEASONAL ALLERGIES: ICD-10-CM

## 2022-09-27 PROCEDURE — 95117 IMMUNOTHERAPY INJECTIONS: CPT | Performed by: ALLERGY & IMMUNOLOGY

## 2022-10-17 ENCOUNTER — NURSE ONLY (OUTPATIENT)
Dept: ALLERGY | Facility: CLINIC | Age: 47
End: 2022-10-17
Payer: COMMERCIAL

## 2022-10-17 DIAGNOSIS — J30.89 ENVIRONMENTAL AND SEASONAL ALLERGIES: ICD-10-CM

## 2022-10-17 PROCEDURE — 95117 IMMUNOTHERAPY INJECTIONS: CPT | Performed by: ALLERGY & IMMUNOLOGY

## 2022-10-24 ENCOUNTER — NURSE ONLY (OUTPATIENT)
Dept: ALLERGY | Facility: CLINIC | Age: 47
End: 2022-10-24
Payer: COMMERCIAL

## 2022-10-24 DIAGNOSIS — J30.89 ENVIRONMENTAL AND SEASONAL ALLERGIES: ICD-10-CM

## 2022-10-31 ENCOUNTER — NURSE ONLY (OUTPATIENT)
Dept: ALLERGY | Facility: CLINIC | Age: 47
End: 2022-10-31
Payer: COMMERCIAL

## 2022-10-31 DIAGNOSIS — J30.89 ENVIRONMENTAL AND SEASONAL ALLERGIES: ICD-10-CM

## 2022-10-31 PROCEDURE — 95117 IMMUNOTHERAPY INJECTIONS: CPT | Performed by: ALLERGY & IMMUNOLOGY

## 2022-11-10 ENCOUNTER — OFFICE VISIT (OUTPATIENT)
Dept: DERMATOLOGY CLINIC | Facility: CLINIC | Age: 47
End: 2022-11-10
Payer: COMMERCIAL

## 2022-11-10 DIAGNOSIS — R20.8 DYSESTHESIA OF SCALP: ICD-10-CM

## 2022-11-10 DIAGNOSIS — L64.9 ANDROGENETIC ALOPECIA: ICD-10-CM

## 2022-11-10 DIAGNOSIS — L65.0 TELOGEN EFFLUVIUM: Primary | ICD-10-CM

## 2022-11-10 DIAGNOSIS — L73.9 FOLLICULITIS: ICD-10-CM

## 2022-11-10 PROCEDURE — 99214 OFFICE O/P EST MOD 30 MIN: CPT | Performed by: STUDENT IN AN ORGANIZED HEALTH CARE EDUCATION/TRAINING PROGRAM

## 2022-11-10 RX ORDER — FLUOCINONIDE TOPICAL SOLUTION USP, 0.05% 0.5 MG/ML
SOLUTION TOPICAL
Qty: 60 ML | Refills: 5 | Status: SHIPPED | OUTPATIENT
Start: 2022-11-10

## 2022-11-10 RX ORDER — MINOXIDIL 2.5 MG/1
1.25 TABLET ORAL DAILY
Qty: 90 TABLET | Refills: 0 | Status: SHIPPED | OUTPATIENT
Start: 2022-11-10

## 2022-11-10 RX ORDER — DEXTROAMPHETAMINE SACCHARATE, AMPHETAMINE ASPARTATE MONOHYDRATE, DEXTROAMPHETAMINE SULFATE AND AMPHETAMINE SULFATE 5; 5; 5; 5 MG/1; MG/1; MG/1; MG/1
20 CAPSULE, EXTENDED RELEASE ORAL EVERY MORNING
COMMUNITY
Start: 2022-09-13

## 2022-12-05 ENCOUNTER — NURSE ONLY (OUTPATIENT)
Dept: ALLERGY | Facility: CLINIC | Age: 47
End: 2022-12-05
Payer: COMMERCIAL

## 2022-12-05 DIAGNOSIS — J30.89 ENVIRONMENTAL AND SEASONAL ALLERGIES: ICD-10-CM

## 2022-12-05 PROCEDURE — 95117 IMMUNOTHERAPY INJECTIONS: CPT | Performed by: ALLERGY & IMMUNOLOGY

## 2022-12-21 ENCOUNTER — NURSE ONLY (OUTPATIENT)
Dept: ALLERGY | Facility: CLINIC | Age: 47
End: 2022-12-21
Payer: COMMERCIAL

## 2022-12-21 DIAGNOSIS — J30.89 ENVIRONMENTAL AND SEASONAL ALLERGIES: ICD-10-CM

## 2022-12-21 PROCEDURE — 95117 IMMUNOTHERAPY INJECTIONS: CPT | Performed by: ALLERGY & IMMUNOLOGY

## 2022-12-21 PROCEDURE — 95165 ANTIGEN THERAPY SERVICES: CPT | Performed by: ALLERGY & IMMUNOLOGY

## 2022-12-27 ENCOUNTER — NURSE ONLY (OUTPATIENT)
Dept: ALLERGY | Facility: CLINIC | Age: 47
End: 2022-12-27
Payer: COMMERCIAL

## 2022-12-27 DIAGNOSIS — J30.89 ENVIRONMENTAL AND SEASONAL ALLERGIES: ICD-10-CM

## 2022-12-27 PROCEDURE — 95117 IMMUNOTHERAPY INJECTIONS: CPT | Performed by: ALLERGY & IMMUNOLOGY

## 2022-12-27 PROCEDURE — 95165 ANTIGEN THERAPY SERVICES: CPT | Performed by: ALLERGY & IMMUNOLOGY

## 2023-01-16 ENCOUNTER — NURSE ONLY (OUTPATIENT)
Dept: ALLERGY | Facility: CLINIC | Age: 48
End: 2023-01-16

## 2023-01-16 DIAGNOSIS — J30.89 ENVIRONMENTAL AND SEASONAL ALLERGIES: ICD-10-CM

## 2023-01-16 PROCEDURE — 95117 IMMUNOTHERAPY INJECTIONS: CPT | Performed by: ALLERGY & IMMUNOLOGY

## 2023-01-23 ENCOUNTER — LAB REQUISITION (OUTPATIENT)
Dept: OCCUPATIONAL MEDICINE | Age: 48
End: 2023-01-23

## 2023-01-23 DIAGNOSIS — Z00.00 ENCOUNTER FOR GENERAL ADULT MEDICAL EXAMINATION WITHOUT ABNORMAL FINDINGS: ICD-10-CM

## 2023-01-23 LAB
HBV SURFACE AB SER QL: NEGATIVE
RUBV IGG SERPL IA-ACNC: 193.3 UNITS/ML

## 2023-01-23 PROCEDURE — PSEU8230 VARICELLA ZOSTER IMMUNITY IGG: Performed by: CLINICAL MEDICAL LABORATORY

## 2023-01-23 PROCEDURE — PSEU8571 RUBELLA ANTIBODY IGG: Performed by: CLINICAL MEDICAL LABORATORY

## 2023-01-23 PROCEDURE — 86735 MUMPS ANTIBODY: CPT | Performed by: CLINICAL MEDICAL LABORATORY

## 2023-01-23 PROCEDURE — PSEU8566 HEPATITIS B SURFACE ANTIBODY: Performed by: CLINICAL MEDICAL LABORATORY

## 2023-01-23 PROCEDURE — PSEU8542 MUMPS IMMUNE ANTIBODY IGG: Performed by: CLINICAL MEDICAL LABORATORY

## 2023-01-23 PROCEDURE — 86706 HEP B SURFACE ANTIBODY: CPT | Performed by: CLINICAL MEDICAL LABORATORY

## 2023-01-23 PROCEDURE — 86765 RUBEOLA ANTIBODY: CPT | Performed by: CLINICAL MEDICAL LABORATORY

## 2023-01-23 PROCEDURE — 86787 VARICELLA-ZOSTER ANTIBODY: CPT | Performed by: CLINICAL MEDICAL LABORATORY

## 2023-01-23 PROCEDURE — 86480 TB TEST CELL IMMUN MEASURE: CPT | Performed by: CLINICAL MEDICAL LABORATORY

## 2023-01-23 PROCEDURE — 86762 RUBELLA ANTIBODY: CPT | Performed by: CLINICAL MEDICAL LABORATORY

## 2023-01-23 PROCEDURE — PSEU8553 RUBEOLA IMMUNITY IGG: Performed by: CLINICAL MEDICAL LABORATORY

## 2023-01-23 PROCEDURE — PSEU9049 QUANTIFERON TB PLUS: Performed by: CLINICAL MEDICAL LABORATORY

## 2023-01-24 LAB
MUV IGG SER IA-ACNC: 1.85 OD RATIO
VZV IGG SER IA-ACNC: NORMAL

## 2023-01-25 LAB
GAMMA INTERFERON BACKGROUND BLD IA-ACNC: 0.11 IU/ML
M TB IFN-G BLD-IMP: NEGATIVE
M TB IFN-G CD4+ BCKGRND COR BLD-ACNC: 0 IU/ML
M TB IFN-G CD4+CD8+ BCKGRND COR BLD-ACNC: 0 IU/ML
MEV IGG SER QL IA: NORMAL
MITOGEN IGNF BCKGRD COR BLD-ACNC: >10 IU/ML

## 2023-01-30 ENCOUNTER — NURSE ONLY (OUTPATIENT)
Dept: ALLERGY | Facility: CLINIC | Age: 48
End: 2023-01-30

## 2023-01-30 DIAGNOSIS — J30.89 ENVIRONMENTAL AND SEASONAL ALLERGIES: ICD-10-CM

## 2023-01-30 PROCEDURE — 95117 IMMUNOTHERAPY INJECTIONS: CPT | Performed by: ALLERGY & IMMUNOLOGY

## 2023-02-13 ENCOUNTER — NURSE ONLY (OUTPATIENT)
Dept: ALLERGY | Facility: CLINIC | Age: 48
End: 2023-02-13

## 2023-02-13 DIAGNOSIS — J30.89 ENVIRONMENTAL AND SEASONAL ALLERGIES: ICD-10-CM

## 2023-02-13 RX ORDER — MINOXIDIL 2.5 MG/1
1.25 TABLET ORAL DAILY
Qty: 90 TABLET | Refills: 0 | OUTPATIENT
Start: 2023-02-13

## 2023-03-06 ENCOUNTER — TELEMEDICINE (OUTPATIENT)
Dept: INTERNAL MEDICINE CLINIC | Facility: CLINIC | Age: 48
End: 2023-03-06

## 2023-03-06 DIAGNOSIS — J01.11 ACUTE RECURRENT FRONTAL SINUSITIS: Primary | ICD-10-CM

## 2023-03-06 PROCEDURE — 99213 OFFICE O/P EST LOW 20 MIN: CPT | Performed by: INTERNAL MEDICINE

## 2023-03-06 RX ORDER — AZITHROMYCIN 250 MG/1
TABLET, FILM COATED ORAL
Qty: 6 TABLET | Refills: 0 | Status: SHIPPED | OUTPATIENT
Start: 2023-03-06 | End: 2023-03-11

## 2023-04-03 ENCOUNTER — NURSE ONLY (OUTPATIENT)
Dept: ALLERGY | Facility: CLINIC | Age: 48
End: 2023-04-03

## 2023-04-03 DIAGNOSIS — J30.89 ENVIRONMENTAL AND SEASONAL ALLERGIES: ICD-10-CM

## 2023-04-03 PROCEDURE — 95165 ANTIGEN THERAPY SERVICES: CPT | Performed by: ALLERGY & IMMUNOLOGY

## 2023-04-03 PROCEDURE — 95117 IMMUNOTHERAPY INJECTIONS: CPT | Performed by: ALLERGY & IMMUNOLOGY

## 2023-04-11 ENCOUNTER — NURSE ONLY (OUTPATIENT)
Dept: ALLERGY | Facility: CLINIC | Age: 48
End: 2023-04-11

## 2023-04-11 DIAGNOSIS — J30.89 ENVIRONMENTAL AND SEASONAL ALLERGIES: ICD-10-CM

## 2023-04-11 PROCEDURE — 95117 IMMUNOTHERAPY INJECTIONS: CPT | Performed by: ALLERGY & IMMUNOLOGY

## 2023-04-18 ENCOUNTER — NURSE ONLY (OUTPATIENT)
Dept: ALLERGY | Facility: CLINIC | Age: 48
End: 2023-04-18

## 2023-04-18 DIAGNOSIS — J30.89 ENVIRONMENTAL AND SEASONAL ALLERGIES: ICD-10-CM

## 2023-04-18 PROCEDURE — 95117 IMMUNOTHERAPY INJECTIONS: CPT | Performed by: ALLERGY & IMMUNOLOGY

## 2023-05-01 ENCOUNTER — NURSE ONLY (OUTPATIENT)
Dept: ALLERGY | Facility: CLINIC | Age: 48
End: 2023-05-01

## 2023-05-01 DIAGNOSIS — J30.89 ENVIRONMENTAL AND SEASONAL ALLERGIES: ICD-10-CM

## 2023-05-01 PROCEDURE — 95117 IMMUNOTHERAPY INJECTIONS: CPT | Performed by: ALLERGY & IMMUNOLOGY

## 2023-05-01 PROCEDURE — 95165 ANTIGEN THERAPY SERVICES: CPT | Performed by: ALLERGY & IMMUNOLOGY

## 2023-05-25 ENCOUNTER — HOSPITAL ENCOUNTER (OUTPATIENT)
Age: 48
Discharge: HOME | End: 2023-05-25
Payer: COMMERCIAL

## 2023-05-25 VITALS
DIASTOLIC BLOOD PRESSURE: 83 MMHG | RESPIRATION RATE: 18 BRPM | SYSTOLIC BLOOD PRESSURE: 118 MMHG | HEART RATE: 109 BPM | OXYGEN SATURATION: 100 % | TEMPERATURE: 98 F

## 2023-05-25 DIAGNOSIS — J02.9 ACUTE VIRAL PHARYNGITIS: Primary | ICD-10-CM

## 2023-05-25 DIAGNOSIS — S39.012A LUMBAR STRAIN, INITIAL ENCOUNTER: ICD-10-CM

## 2023-05-25 LAB — S PYO AG THROAT QL: NEGATIVE

## 2023-05-25 RX ORDER — METHYLPREDNISOLONE 4 MG/1
TABLET ORAL
Qty: 1 EACH | Refills: 0 | Status: SHIPPED | OUTPATIENT
Start: 2023-05-25

## 2023-05-25 RX ORDER — NAPROXEN 500 MG/1
500 TABLET ORAL 2 TIMES DAILY WITH MEALS
Qty: 28 TABLET | Refills: 0 | Status: SHIPPED | OUTPATIENT
Start: 2023-05-25 | End: 2023-06-08

## 2023-05-26 NOTE — TELEPHONE ENCOUNTER
Refill Request for medication(s): minoxidil 2.5 MG Oral Tab    Last Office Visit: 11/2022    Last Refill: 3/2023    Pharmacy, Dosage verified: [id number] [address], [phone], [phone]    Condition Update (if applicable): msg sent    Rx pended and sent to provider for approval, please advise. Thank You!

## 2023-05-26 NOTE — ED INITIAL ASSESSMENT (HPI)
Pt presents with sore throat x 24 hours, pt reports, \"my whole family has strep\". No fever, mild congestion. Pt reports back pain x 2 weeks. Pt reports \"pain across whole lower back\". Pt reports, \"I think I may have pulled something and it got worse when I mowed the lawn\". Pt took Tylenol PO in the morning and afternoon today.

## 2023-05-26 NOTE — DISCHARGE INSTRUCTIONS
Negative for strep throat. This is likely a viral pharyngitis and/or your allergies. I recommend taking daily Claritin or Zyrtec nondrowsy. Sleep somewhat elevated and upright. Sleep is emitted fire. Make sure you are drinking plenty of fluids and stay well-hydrated. Avoid foods that are: Fatty, fried, spicy, citrus, acidic. Avoid citrus, acidic, carbonated beverages. You may purchase over-the-counter cold medication and supple call lozenges to alleviate and soothe throat. For your back, I prescribed naproxen and a Medrol Dosepak. Please follow directions regarding Medrol Dosepak. Naproxen twice a day for 14 days. This medication is similar to Motrin, Advil, Aleve, ibuprofen, do not take these medications while taking naproxen. Continue with ice and heat. Rest, no strenuous physical activity, heavy lifting, physical exertion. If you have any new or worsening low back pain, inability to ambulate, loss of bowel or bladder, altered sensation with wiping, fevers above 100.4, please go to ER.

## 2023-05-30 RX ORDER — CLINDAMYCIN PHOSPHATE 10 MG/G
GEL TOPICAL
Qty: 30 G | Refills: 2 | Status: SHIPPED | OUTPATIENT
Start: 2023-05-30

## 2023-05-30 RX ORDER — MINOXIDIL 2.5 MG/1
TABLET ORAL
Qty: 30 TABLET | Refills: 0 | Status: SHIPPED | OUTPATIENT
Start: 2023-05-30

## 2023-05-31 RX ORDER — MINOXIDIL 2.5 MG/1
TABLET ORAL
Qty: 45 TABLET | Refills: 0 | OUTPATIENT
Start: 2023-05-31

## 2023-05-31 NOTE — TELEPHONE ENCOUNTER
Per 5/30/23:    \"Would like to see you before refilling the minoxidil as he need to see patients every 6 months while on this pill. I will go ahead and send in a 1 month supply. This request was generated by pharmacy. Denied for 90 days supply per order above. DM please disregard.

## 2023-06-19 ENCOUNTER — NURSE ONLY (OUTPATIENT)
Dept: ALLERGY | Facility: CLINIC | Age: 48
End: 2023-06-19

## 2023-06-19 DIAGNOSIS — J30.89 ENVIRONMENTAL AND SEASONAL ALLERGIES: ICD-10-CM

## 2023-06-19 PROCEDURE — 95117 IMMUNOTHERAPY INJECTIONS: CPT | Performed by: ALLERGY & IMMUNOLOGY

## 2023-06-19 PROCEDURE — 95165 ANTIGEN THERAPY SERVICES: CPT | Performed by: ALLERGY & IMMUNOLOGY

## 2023-06-26 ENCOUNTER — NURSE ONLY (OUTPATIENT)
Dept: ALLERGY | Facility: CLINIC | Age: 48
End: 2023-06-26

## 2023-06-26 DIAGNOSIS — J30.89 ENVIRONMENTAL AND SEASONAL ALLERGIES: Primary | ICD-10-CM

## 2023-07-03 ENCOUNTER — NURSE ONLY (OUTPATIENT)
Dept: ALLERGY | Facility: CLINIC | Age: 48
End: 2023-07-03

## 2023-07-03 DIAGNOSIS — J30.89 ENVIRONMENTAL AND SEASONAL ALLERGIES: Primary | ICD-10-CM

## 2023-07-25 ENCOUNTER — NURSE ONLY (OUTPATIENT)
Dept: ALLERGY | Facility: CLINIC | Age: 48
End: 2023-07-25

## 2023-07-25 DIAGNOSIS — J30.89 ENVIRONMENTAL AND SEASONAL ALLERGIES: Primary | ICD-10-CM

## 2023-07-25 PROCEDURE — 95117 IMMUNOTHERAPY INJECTIONS: CPT | Performed by: ALLERGY & IMMUNOLOGY

## 2023-07-25 PROCEDURE — 95165 ANTIGEN THERAPY SERVICES: CPT | Performed by: ALLERGY & IMMUNOLOGY

## 2023-07-31 ENCOUNTER — OFFICE VISIT (OUTPATIENT)
Dept: INTERNAL MEDICINE CLINIC | Facility: CLINIC | Age: 48
End: 2023-07-31

## 2023-07-31 VITALS
OXYGEN SATURATION: 99 % | HEART RATE: 110 BPM | RESPIRATION RATE: 19 BRPM | DIASTOLIC BLOOD PRESSURE: 77 MMHG | SYSTOLIC BLOOD PRESSURE: 110 MMHG | BODY MASS INDEX: 28 KG/M2 | HEIGHT: 64 IN | WEIGHT: 164 LBS | TEMPERATURE: 98 F

## 2023-07-31 DIAGNOSIS — Z02.1 PRE-EMPLOYMENT EXAMINATION: Primary | ICD-10-CM

## 2023-07-31 DIAGNOSIS — G89.29 CHRONIC RIGHT-SIDED LOW BACK PAIN WITHOUT SCIATICA: ICD-10-CM

## 2023-07-31 DIAGNOSIS — M54.50 CHRONIC RIGHT-SIDED LOW BACK PAIN WITHOUT SCIATICA: ICD-10-CM

## 2023-07-31 PROCEDURE — 3074F SYST BP LT 130 MM HG: CPT | Performed by: INTERNAL MEDICINE

## 2023-07-31 PROCEDURE — 99214 OFFICE O/P EST MOD 30 MIN: CPT | Performed by: INTERNAL MEDICINE

## 2023-07-31 PROCEDURE — 3008F BODY MASS INDEX DOCD: CPT | Performed by: INTERNAL MEDICINE

## 2023-07-31 PROCEDURE — 3078F DIAST BP <80 MM HG: CPT | Performed by: INTERNAL MEDICINE

## 2023-07-31 NOTE — PROGRESS NOTES
Subjective:     Patient ID: Jeanne Winsolw is a 57year old female. 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 Patient overall healthy doing okay does have on and off low back pain in the past x-rays with mild arthritis she states most days she is fine except that she gets some flareup been having some discomfort lately    History/Other:   Review of Systems   Constitutional: Negative. HENT: Negative. Eyes: Negative. Respiratory: Negative. Cardiovascular: Negative. Gastrointestinal: Negative. Genitourinary: Negative. Musculoskeletal:  Positive for back pain. Back pain on and off mild   Skin: Negative. Neurological: Negative. Psychiatric/Behavioral: Negative. Current Outpatient Medications   Medication Sig Dispense Refill    MINOXIDIL 2.5 MG Oral Tab TAKE 1/2 TABLET BY MOUTH DAILY 30 tablet 0    Clindamycin Phosphate 1 % External Gel Use up to twice daily for new pimples on scalp 30 g 2    Amphetamine-Dextroamphet ER 20 MG Oral Capsule SR 24 Hr Take 1 capsule (20 mg total) by mouth every morning. naproxen 500 MG Oral Tab Take 1 tablet (500 mg total) by mouth as needed.        Allergies:  Eucalyptus Oil          SHORTNESS OF BREATH  Pollen Extract          UNKNOWN    Past Medical History:   Diagnosis Date    Allergic rhinitis     Chronic allergic conjunctivitis     Dry eye syndrome of both eyes     Hx of LASIK 1999    Paresis of accommodation, bilateral       Past Surgical History:   Procedure Laterality Date    COLONOSCOPY N/A 6/30/2021    Procedure: COLONOSCOPY with biopsy;  Surgeon: Maria L Medina MD;  Location: [facility] ENDO    LASIK Bilateral 1999    [id number] MD Mari    OTHER SURGICAL HISTORY      SINUS SURGERY    2016      Family History   Problem Relation Age of Onset    Diabetes Father     Macular degeneration Mother     Diabetes Sister     Cataracts Other     Hypertension Other     Breast Cancer Maternal Aunt         30-35 Social History:   Social History     Socioeconomic History    Marital status:    Tobacco Use    Smoking status: Former    Smokeless tobacco: Never   Substance and Sexual Activity    Alcohol use: Yes     Comment: rarely    Drug use: Never   Other Topics Concern    Breast feeding No    Reaction to local anesthetic No    Caffeine Concern Yes    Exercise Yes   Social History Narrative    The patient does not use an assistive device. .      The patient does live in a home with stairs. Objective:   Physical Exam  Vitals and nursing note reviewed. Constitutional:       Appearance: She is well-developed. HENT:      Head: Normocephalic and atraumatic. Right Ear: External ear normal.      Left Ear: External ear normal.      Nose: Nose normal.   Eyes:      Conjunctiva/sclera: Conjunctivae normal.      Pupils: Pupils are equal, round, and reactive to light. Cardiovascular:      Rate and Rhythm: Normal rate and regular rhythm. Heart sounds: Normal heart sounds. Pulmonary:      Effort: Pulmonary effort is normal.      Breath sounds: Normal breath sounds. Abdominal:      General: Bowel sounds are normal.      Palpations: Abdomen is soft. Genitourinary:     Vagina: Normal.   Musculoskeletal:         General: Normal range of motion. Cervical back: Normal range of motion and neck supple. Skin:     General: Skin is warm and dry. Neurological:      Mental Status: She is alert and oriented to person, place, and time. Deep Tendon Reflexes: Reflexes are normal and symmetric. Psychiatric:         Behavior: Behavior normal.         Thought Content:  Thought content normal.         Judgment: Judgment normal.         Assessment & Plan:   Pre-employment examination  (primary encounter diagnosis)- form filled, pt cleared   Chronic right-sided low back pain without sciatica- mild as need med for pain will refer to physical therapy     No orders of the defined types were placed in this encounter.       Meds This Visit:  Requested Prescriptions      No prescriptions requested or ordered in this encounter       Imaging & Referrals:  PHYSICAL THERAPY - INTERNAL

## 2023-08-08 ENCOUNTER — NURSE ONLY (OUTPATIENT)
Dept: ALLERGY | Facility: CLINIC | Age: 48
End: 2023-08-08

## 2023-08-08 DIAGNOSIS — J30.89 ENVIRONMENTAL AND SEASONAL ALLERGIES: Primary | ICD-10-CM

## 2023-08-08 PROCEDURE — 95117 IMMUNOTHERAPY INJECTIONS: CPT | Performed by: ALLERGY & IMMUNOLOGY

## 2023-08-08 PROCEDURE — 95165 ANTIGEN THERAPY SERVICES: CPT | Performed by: ALLERGY & IMMUNOLOGY

## 2023-08-22 ENCOUNTER — NURSE ONLY (OUTPATIENT)
Dept: ALLERGY | Facility: CLINIC | Age: 48
End: 2023-08-22

## 2023-08-22 DIAGNOSIS — J30.89 ENVIRONMENTAL AND SEASONAL ALLERGIES: Primary | ICD-10-CM

## 2023-08-22 PROCEDURE — 95117 IMMUNOTHERAPY INJECTIONS: CPT | Performed by: ALLERGY & IMMUNOLOGY

## 2023-08-29 ENCOUNTER — NURSE ONLY (OUTPATIENT)
Dept: ALLERGY | Facility: CLINIC | Age: 48
End: 2023-08-29

## 2023-08-29 ENCOUNTER — OFFICE VISIT (OUTPATIENT)
Dept: DERMATOLOGY CLINIC | Facility: CLINIC | Age: 48
End: 2023-08-29

## 2023-08-29 DIAGNOSIS — L21.9 SEBORRHEIC DERMATITIS: ICD-10-CM

## 2023-08-29 DIAGNOSIS — J30.89 ENVIRONMENTAL AND SEASONAL ALLERGIES: Primary | ICD-10-CM

## 2023-08-29 DIAGNOSIS — L65.0 TELOGEN EFFLUVIUM: Primary | ICD-10-CM

## 2023-08-29 DIAGNOSIS — L73.9 FOLLICULITIS: ICD-10-CM

## 2023-08-29 PROCEDURE — 99214 OFFICE O/P EST MOD 30 MIN: CPT | Performed by: STUDENT IN AN ORGANIZED HEALTH CARE EDUCATION/TRAINING PROGRAM

## 2023-08-29 PROCEDURE — 95117 IMMUNOTHERAPY INJECTIONS: CPT | Performed by: ALLERGY & IMMUNOLOGY

## 2023-08-29 RX ORDER — FLUOCINONIDE TOPICAL SOLUTION USP, 0.05% 0.5 MG/ML
SOLUTION TOPICAL
Qty: 60 ML | Refills: 5 | Status: SHIPPED | OUTPATIENT
Start: 2023-08-29

## 2023-08-29 RX ORDER — MINOXIDIL 2.5 MG/1
2.5 TABLET ORAL DAILY
Qty: 180 TABLET | Refills: 0 | Status: SHIPPED | OUTPATIENT
Start: 2023-08-29

## 2023-09-14 ENCOUNTER — NURSE ONLY (OUTPATIENT)
Dept: ALLERGY | Facility: CLINIC | Age: 48
End: 2023-09-14

## 2023-09-14 DIAGNOSIS — J30.89 ENVIRONMENTAL AND SEASONAL ALLERGIES: Primary | ICD-10-CM

## 2023-09-14 PROCEDURE — 95117 IMMUNOTHERAPY INJECTIONS: CPT | Performed by: ALLERGY & IMMUNOLOGY

## 2023-09-14 PROCEDURE — 95165 ANTIGEN THERAPY SERVICES: CPT | Performed by: ALLERGY & IMMUNOLOGY

## 2023-10-03 ENCOUNTER — NURSE ONLY (OUTPATIENT)
Dept: ALLERGY | Facility: CLINIC | Age: 48
End: 2023-10-03

## 2023-10-03 DIAGNOSIS — J30.89 ENVIRONMENTAL AND SEASONAL ALLERGIES: Primary | ICD-10-CM

## 2023-10-03 PROCEDURE — 95165 ANTIGEN THERAPY SERVICES: CPT | Performed by: INTERNAL MEDICINE

## 2023-10-03 PROCEDURE — 95117 IMMUNOTHERAPY INJECTIONS: CPT | Performed by: INTERNAL MEDICINE

## 2023-10-31 ENCOUNTER — NURSE ONLY (OUTPATIENT)
Dept: ALLERGY | Facility: CLINIC | Age: 48
End: 2023-10-31

## 2023-10-31 DIAGNOSIS — J30.89 ENVIRONMENTAL AND SEASONAL ALLERGIES: Primary | ICD-10-CM

## 2023-10-31 PROCEDURE — 95117 IMMUNOTHERAPY INJECTIONS: CPT | Performed by: ALLERGY & IMMUNOLOGY

## 2023-11-02 ENCOUNTER — HOSPITAL ENCOUNTER (OUTPATIENT)
Age: 48
Discharge: HOME | End: 2023-11-02
Payer: COMMERCIAL

## 2023-11-02 VITALS
HEART RATE: 89 BPM | OXYGEN SATURATION: 98 % | DIASTOLIC BLOOD PRESSURE: 74 MMHG | TEMPERATURE: 97 F | RESPIRATION RATE: 18 BRPM | SYSTOLIC BLOOD PRESSURE: 107 MMHG

## 2023-11-02 DIAGNOSIS — M62.838 NECK MUSCLE SPASM: Primary | ICD-10-CM

## 2023-11-02 RX ORDER — TIZANIDINE HYDROCHLORIDE 4 MG/1
4 CAPSULE, GELATIN COATED ORAL 3 TIMES DAILY PRN
Qty: 10 CAPSULE | Refills: 0 | Status: SHIPPED | OUTPATIENT
Start: 2023-11-02

## 2023-11-02 NOTE — DISCHARGE INSTRUCTIONS
Take ibuprofen or Aleve as needed for pain. Try the muscle relaxer. Apply heat. Gentle stretching. Recommend massage. Follow-up with physical therapy as scheduled.   Notify your primary doctor

## 2023-11-20 ENCOUNTER — E-VISIT (OUTPATIENT)
Dept: TELEHEALTH | Age: 48
End: 2023-11-20
Payer: COMMERCIAL

## 2023-11-20 DIAGNOSIS — R05.1 ACUTE COUGH: ICD-10-CM

## 2023-11-20 DIAGNOSIS — R50.9 FEVER, UNSPECIFIED FEVER CAUSE: Primary | ICD-10-CM

## 2023-11-21 ENCOUNTER — APPOINTMENT (OUTPATIENT)
Dept: PHYSICAL THERAPY | Age: 48
End: 2023-11-21
Attending: INTERNAL MEDICINE
Payer: COMMERCIAL

## 2023-11-21 NOTE — PROGRESS NOTES
Colette James is a 57year old female. HPI:   See answers to questions above. Cough x 4 days with fever  Current Outpatient Medications   Medication Sig Dispense Refill    tiZANidine HCl 4 MG Oral Cap Take 1 capsule (4 mg total) by mouth 3 (three) times daily as needed for Muscle spasms. 10 capsule 0    Fluocinonide 0.05 % External Solution Use twice daily Monday-Friday with flares. Do not use on face. 60 mL 5    minoxidil 2.5 MG Oral Tab Take 1 tablet (2.5 mg total) by mouth daily. 180 tablet 0    MINOXIDIL 2.5 MG Oral Tab TAKE 1/2 TABLET BY MOUTH DAILY 30 tablet 0    Clindamycin Phosphate 1 % External Gel Use up to twice daily for new pimples on scalp 30 g 2    Amphetamine-Dextroamphet ER 20 MG Oral Capsule SR 24 Hr Take 1 capsule (20 mg total) by mouth every morning. naproxen 500 MG Oral Tab Take 1 tablet (500 mg total) by mouth as needed.         Past Medical History:   Diagnosis Date    Allergic rhinitis     Chronic allergic conjunctivitis     Dry eye syndrome of both eyes     Hx of LASIK 1999    Paresis of accommodation, bilateral       Past Surgical History:   Procedure Laterality Date    COLONOSCOPY N/A 6/30/2021    Procedure: COLONOSCOPY with biopsy;  Surgeon: James Nicole MD;  Location: [facility] ENDO    LASIK Bilateral 1999    Sahil Murcia MD    OTHER SURGICAL HISTORY      SINUS SURGERY    2016      Family History   Problem Relation Age of Onset    Diabetes Father     Macular degeneration Mother     Diabetes Sister     Cataracts Other     Hypertension Other     Breast Cancer Maternal Aunt         30-35      Social History:  Social History     Socioeconomic History    Marital status:    Tobacco Use    Smoking status: Former    Smokeless tobacco: Never   Substance and Sexual Activity    Alcohol use: Yes     Comment: rarely    Drug use: Never   Other Topics Concern    Breast feeding No    Reaction to local anesthetic No    Caffeine Concern Yes    Exercise Yes    Pt has a pacemaker No Social History Narrative    The patient does not use an assistive device. .      The patient does live in a home with stairs. ASSESSMENT AND PLAN:     No diagnosis found. Due to reported worsening symptoms and fever, recommended in person evaluation for physical examination.  Provided with [facility] [place] [id number] for this Visit:  Requested Prescriptions      No prescriptions requested or ordered in this encounter       Duration of  the service:  8 minutes    Patient advised to follow up with PCP if no improvement or worsening of symptoms  Refer to [employer] message for specific patient instructions

## 2023-11-26 ENCOUNTER — APPOINTMENT (OUTPATIENT)
Dept: GENERAL RADIOLOGY | Age: 48
End: 2023-11-26
Attending: NURSE PRACTITIONER
Payer: COMMERCIAL

## 2023-11-26 ENCOUNTER — HOSPITAL ENCOUNTER (OUTPATIENT)
Age: 48
Discharge: HOME | End: 2023-11-26
Payer: COMMERCIAL

## 2023-11-26 VITALS
SYSTOLIC BLOOD PRESSURE: 118 MMHG | DIASTOLIC BLOOD PRESSURE: 75 MMHG | HEART RATE: 103 BPM | RESPIRATION RATE: 16 BRPM | OXYGEN SATURATION: 97 % | TEMPERATURE: 98 F

## 2023-11-26 DIAGNOSIS — R05.1 ACUTE COUGH: ICD-10-CM

## 2023-11-26 DIAGNOSIS — J18.9 PNEUMONIA OF RIGHT MIDDLE LOBE DUE TO INFECTIOUS ORGANISM: Primary | ICD-10-CM

## 2023-11-26 PROCEDURE — 71046 X-RAY EXAM CHEST 2 VIEWS: CPT | Performed by: NURSE PRACTITIONER

## 2023-11-26 PROCEDURE — 99214 OFFICE O/P EST MOD 30 MIN: CPT | Performed by: NURSE PRACTITIONER

## 2023-11-26 RX ORDER — DOXYCYCLINE HYCLATE 100 MG/1
100 CAPSULE ORAL 2 TIMES DAILY
Qty: 14 CAPSULE | Refills: 0 | Status: SHIPPED | OUTPATIENT
Start: 2023-11-26 | End: 2023-12-01

## 2023-11-26 RX ORDER — AMOXICILLIN AND CLAVULANATE POTASSIUM 875; 125 MG/1; MG/1
1 TABLET, FILM COATED ORAL 2 TIMES DAILY
Qty: 14 TABLET | Refills: 0 | Status: SHIPPED | OUTPATIENT
Start: 2023-11-26 | End: 2023-12-01

## 2023-11-26 NOTE — DISCHARGE INSTRUCTIONS
Augmentin 1 tablet twice per day for 7 days with food  Doxycycline 1 tablet twice per day for 7 days with food and large glass of water  Push fluids  Steam showers  Worsening symptoms, please go to the ER  Please follow-up with your doctor in 2 days for recheck

## 2023-11-29 ENCOUNTER — TELEPHONE (OUTPATIENT)
Dept: PHYSICAL THERAPY | Facility: HOSPITAL | Age: 48
End: 2023-11-29

## 2023-11-30 ENCOUNTER — APPOINTMENT (OUTPATIENT)
Dept: PHYSICAL THERAPY | Age: 48
End: 2023-11-30
Attending: INTERNAL MEDICINE
Payer: COMMERCIAL

## 2023-12-01 RX ORDER — AMOXICILLIN AND CLAVULANATE POTASSIUM 875; 125 MG/1; MG/1
1 TABLET, FILM COATED ORAL 2 TIMES DAILY
Qty: 14 TABLET | Refills: 0 | Status: SHIPPED | OUTPATIENT
Start: 2023-12-01 | End: 2023-12-08

## 2023-12-01 RX ORDER — DOXYCYCLINE HYCLATE 100 MG/1
100 CAPSULE ORAL 2 TIMES DAILY
Qty: 14 CAPSULE | Refills: 0 | Status: SHIPPED | OUTPATIENT
Start: 2023-12-01 | End: 2023-12-08

## 2023-12-05 ENCOUNTER — APPOINTMENT (OUTPATIENT)
Dept: PHYSICAL THERAPY | Age: 48
End: 2023-12-05
Attending: INTERNAL MEDICINE
Payer: COMMERCIAL

## 2023-12-07 ENCOUNTER — APPOINTMENT (OUTPATIENT)
Dept: PHYSICAL THERAPY | Age: 48
End: 2023-12-07
Attending: INTERNAL MEDICINE
Payer: COMMERCIAL

## 2023-12-12 ENCOUNTER — APPOINTMENT (OUTPATIENT)
Dept: GENERAL RADIOLOGY | Age: 48
End: 2023-12-12
Attending: PHYSICIAN ASSISTANT
Payer: COMMERCIAL

## 2023-12-12 ENCOUNTER — APPOINTMENT (OUTPATIENT)
Dept: PHYSICAL THERAPY | Age: 48
End: 2023-12-12
Attending: INTERNAL MEDICINE
Payer: COMMERCIAL

## 2023-12-12 ENCOUNTER — HOSPITAL ENCOUNTER (OUTPATIENT)
Age: 48
Discharge: HOME | End: 2023-12-12
Payer: COMMERCIAL

## 2023-12-12 VITALS
HEART RATE: 85 BPM | SYSTOLIC BLOOD PRESSURE: 113 MMHG | OXYGEN SATURATION: 98 % | BODY MASS INDEX: 29.02 KG/M2 | HEIGHT: 64 IN | DIASTOLIC BLOOD PRESSURE: 80 MMHG | WEIGHT: 170 LBS | TEMPERATURE: 98 F | RESPIRATION RATE: 20 BRPM

## 2023-12-12 DIAGNOSIS — J40 BRONCHITIS: Primary | ICD-10-CM

## 2023-12-12 PROCEDURE — 71046 X-RAY EXAM CHEST 2 VIEWS: CPT | Performed by: PHYSICIAN ASSISTANT

## 2023-12-12 PROCEDURE — 99204 OFFICE O/P NEW MOD 45 MIN: CPT | Performed by: PHYSICIAN ASSISTANT

## 2023-12-12 RX ORDER — ALBUTEROL SULFATE 90 UG/1
2 AEROSOL, METERED RESPIRATORY (INHALATION) EVERY 4 HOURS PRN
Qty: 1 EACH | Refills: 0 | Status: SHIPPED | OUTPATIENT
Start: 2023-12-12 | End: 2024-01-11

## 2023-12-12 RX ORDER — BENZONATATE 100 MG/1
100 CAPSULE ORAL 3 TIMES DAILY PRN
Qty: 30 CAPSULE | Refills: 0 | Status: SHIPPED | OUTPATIENT
Start: 2023-12-12 | End: 2024-01-11

## 2023-12-14 ENCOUNTER — APPOINTMENT (OUTPATIENT)
Dept: PHYSICAL THERAPY | Age: 48
End: 2023-12-14
Attending: INTERNAL MEDICINE
Payer: COMMERCIAL

## 2023-12-19 ENCOUNTER — APPOINTMENT (OUTPATIENT)
Dept: PHYSICAL THERAPY | Age: 48
End: 2023-12-19
Attending: INTERNAL MEDICINE
Payer: COMMERCIAL

## 2023-12-21 ENCOUNTER — APPOINTMENT (OUTPATIENT)
Dept: PHYSICAL THERAPY | Age: 48
End: 2023-12-21
Attending: INTERNAL MEDICINE
Payer: COMMERCIAL

## 2023-12-26 ENCOUNTER — OFFICE VISIT (OUTPATIENT)
Dept: ALLERGY | Facility: CLINIC | Age: 48
End: 2023-12-26

## 2023-12-26 ENCOUNTER — NURSE ONLY (OUTPATIENT)
Dept: ALLERGY | Facility: CLINIC | Age: 48
End: 2023-12-26

## 2023-12-26 VITALS
SYSTOLIC BLOOD PRESSURE: 110 MMHG | OXYGEN SATURATION: 97 % | HEART RATE: 78 BPM | DIASTOLIC BLOOD PRESSURE: 74 MMHG | BODY MASS INDEX: 29 KG/M2 | HEIGHT: 64 IN

## 2023-12-26 DIAGNOSIS — J30.89 PERENNIAL ALLERGIC RHINITIS WITH SEASONAL VARIATION: Primary | ICD-10-CM

## 2023-12-26 DIAGNOSIS — L20.81 ATOPIC NEURODERMATITIS: ICD-10-CM

## 2023-12-26 DIAGNOSIS — J30.89 ENVIRONMENTAL AND SEASONAL ALLERGIES: Primary | ICD-10-CM

## 2023-12-26 DIAGNOSIS — Z92.29 COVID-19 VACCINE SERIES COMPLETED: ICD-10-CM

## 2023-12-26 DIAGNOSIS — J34.2 NASAL SEPTAL DEVIATION: ICD-10-CM

## 2023-12-26 DIAGNOSIS — J30.2 PERENNIAL ALLERGIC RHINITIS WITH SEASONAL VARIATION: Primary | ICD-10-CM

## 2023-12-26 DIAGNOSIS — Z23 FLU VACCINE NEED: ICD-10-CM

## 2023-12-26 PROCEDURE — 99214 OFFICE O/P EST MOD 30 MIN: CPT | Performed by: ALLERGY & IMMUNOLOGY

## 2023-12-26 PROCEDURE — 95117 IMMUNOTHERAPY INJECTIONS: CPT | Performed by: ALLERGY & IMMUNOLOGY

## 2023-12-26 PROCEDURE — 3074F SYST BP LT 130 MM HG: CPT | Performed by: ALLERGY & IMMUNOLOGY

## 2023-12-26 PROCEDURE — 3078F DIAST BP <80 MM HG: CPT | Performed by: ALLERGY & IMMUNOLOGY

## 2023-12-26 NOTE — PROGRESS NOTES
Yonas Lozano is a 57year old female. HPI:     Chief Complaint   Patient presents with    Annual     Allergy shots and annual visit. Patient is a 68-year-old female who presents for follow-up with a chief complaint of allergies  Patient has a history of allergic rhinitis and shellfish allergy    Patient currently on immunotherapy to underlying environmental allergens including trees grass ragweed weeds cats and dogs  Maintenance dosing since September 2022      Ar:  Active or persistent symptoms: some, rn, sz nc  Worse in [place]   Active meds:  pse  mucinex flonase  xyzal prn   pets : dogs   Denies adverse events with immunotherapy in the interim. Overall immunotherapy is helping decrease symptoms as well as need for medications    Non-smoker    COVID vaccines reviewed.   3 doses today last in September 2021  No flu vaccine on record for this fall/ + flu vaccine utd   Covid vaccien this fall utd     Pneumonia Nov 2023 , + abx, better now     [facility] today       HISTORY:  Past Medical History:   Diagnosis Date    Allergic rhinitis     Chronic allergic conjunctivitis     Dry eye syndrome of both eyes     Hx of LASIK 1999    Paresis of accommodation, bilateral       Past Surgical History:   Procedure Laterality Date    COLONOSCOPY N/A 6/30/2021    Procedure: COLONOSCOPY with biopsy;  Surgeon: Brent Sanchez MD;  Location: [facility] ENDO    LASIK Bilateral [address] Gad[address] Kirby MD    OTHER SURGICAL HISTORY      SINUS SURGERY    2016      Family History   Problem Relation Age of Onset    Diabetes Father     Macular degeneration Mother     Diabetes Sister     Cataracts Other     Hypertension Other     Breast Cancer Maternal Aunt         30-35      Social History:   Social History     Socioeconomic History    Marital status:    Tobacco Use    Smoking status: Former    Smokeless tobacco: Never   Substance and Sexual Activity    Alcohol use: Yes     Comment: rarely    Drug use: Never   Other Topics Concern Breast feeding No    Reaction to local anesthetic No    Caffeine Concern Yes    Exercise Yes    Pt has a pacemaker No   Social History Narrative    The patient does not use an assistive device. .      The patient does live in a home with stairs. Medications (Active prior to today's visit):  Current Outpatient Medications   Medication Sig Dispense Refill    benzonatate 100 MG Oral Cap Take 1 capsule (100 mg total) by mouth 3 (three) times daily as needed for cough. 30 capsule 0    albuterol 108 (90 Base) MCG/ACT Inhalation Aero Soln Inhale 2 puffs into the lungs every 4 (four) hours as needed for Wheezing. 1 each 0    tiZANidine HCl 4 MG Oral Cap Take 1 capsule (4 mg total) by mouth 3 (three) times daily as needed for Muscle spasms. 10 capsule 0    Fluocinonide 0.05 % External Solution Use twice daily Monday-Friday with flares. Do not use on face. 60 mL 5    minoxidil 2.5 MG Oral Tab Take 1 tablet (2.5 mg total) by mouth daily. 180 tablet 0    Clindamycin Phosphate 1 % External Gel Use up to twice daily for new pimples on scalp 30 g 2    MINOXIDIL 2.5 MG Oral Tab TAKE 1/2 TABLET BY MOUTH DAILY 30 tablet 0    Amphetamine-Dextroamphet ER 20 MG Oral Capsule SR 24 Hr Take 1 capsule (20 mg total) by mouth every morning. naproxen 500 MG Oral Tab Take 1 tablet (500 mg total) by mouth as needed. Allergies:   Allergies   Allergen Reactions    Eucalyptus Oil SHORTNESS OF BREATH and UNKNOWN    Codeine UNKNOWN    Pollen Extract UNKNOWN         ROS:   Allergic/Immuno:  See hpi  Cardiovascular:  Negative for irregular heartbeat/palpitations, chest pain, edema  Constitutional:  Negative night sweats,weight loss, irritability and lethargy  ENMT:  Negative for ear drainage, hearing loss and nasal drainage  Eyes:  Negative for eye discharge and vision loss  Gastrointestinal:  Negative for abdominal pain, diarrhea and vomiting  Integumentary:  Negative for pruritus and rash  Respiratory:  Negative for cough, dyspnea and wheezing    PHYSICAL EXAM:   Constitutional: responsive, no acute distress noted  Head/Face: NC/Atraumatic  Eyes/Vision: conjunctiva and lids are normal extraocular motion is intact   Ears/Audiometry: tympanic membranes are normal bilaterally hearing is grossly intact  Nose/Mouth/Throat: nose and throat are clear mucous membranes are moist   Neck/Thyroid: neck is supple without adenopathy  Lymphatic: no abnormal cervical, supraclavicular or axillary adenopathy is noted  Respiratory: normal to inspection lungs are clear to auscultation bilaterally normal respiratory effort   Cardiovascular: regular rate and rhythm no murmurs, gallups, or rubs  Abdomen: soft non-tender non-distended  Skin/Hair: no unusual rashes present   Extremities: no edema, cyanosis, or clubbing     ASSESSMENT/PLAN:   Assessment   Encounter Diagnoses   Name Primary? Perennial allergic rhinitis with seasonal variation Yes    Nasal septal deviation     COVID-19 vaccine series completed     Flu vaccine need     Atopic neurodermatitis      #1 allergic rhinitis  Continue with maintenance dose immunotherapy every 4 weeks. Patient had been on maintenance dosing since September 2022  Reviewed avoidance measures  Reviewed 3 to 5 years of maintenance dosing  Over immunotherapy is helping decrease symptoms and the need for medication. #2 bronchitis  Recovering from bronchitis from November. Using albuterol as needed. Completed antibiotics. No current fevers or mucopurulence. #3 COVID vaccines up-to-date including booster from this fall    #4 flu vaccine up-to-date from this fall per patient report.       #5 atopic dermatitis  Continue with moisturizers twice a day topical steroids twice a day based upon the read and referral  Delories Higgins is a soap or Cetaphil as a cleanser  Moisturizers include Cetaphil CeraVe Vanicream Aquaphor    Patient received immunotherapy followed by 30 minutes of observation     Orders This Visit:  No orders of the defined types were placed in this encounter. Meds This Visit:  Requested Prescriptions      No prescriptions requested or ordered in this encounter       Imaging & Referrals:  None     12/26/2023  Laurie Kidd MD    If medication samples were provided today, they were provided solely for patient education and training related to self administration of these medications. Teaching, instruction and sample was provided to the patient by myself. Teaching included  a review of potential adverse side effects as well as potential efficacy. Patient's questions were answered in regards to medication administration and dosing and potential side effects.  Teaching was provided via the teach back method

## 2023-12-26 NOTE — PATIENT INSTRUCTIONS
#1 allergic rhinitis  Continue with maintenance dose immunotherapy every 4 weeks. Patient had been on maintenance dosing since September 2022  Reviewed avoidance measures  Reviewed 3 to 5 years of maintenance dosing  Over immunotherapy is helping decrease symptoms and the need for medication. #2 bronchitis  Recovering from bronchitis from November. Using albuterol as needed. Completed antibiotics. No current fevers or mucopurulence. #3 COVID vaccines up-to-date including booster from this fall    #4 flu vaccine up-to-date from this fall per patient report.       #5 atopic dermatitis  Continue with moisturizers twice a day topical steroids twice a day based upon the read and referral  Burke Dimes is a soap or Cetaphil as a cleanser  Moisturizers include Cetaphil CeraVe Vanicream Aquaphor

## 2024-01-09 ENCOUNTER — NURSE ONLY (OUTPATIENT)
Dept: ALLERGY | Facility: CLINIC | Age: 49
End: 2024-01-09

## 2024-01-09 PROCEDURE — 95117 IMMUNOTHERAPY INJECTIONS: CPT | Performed by: ALLERGY & IMMUNOLOGY

## 2024-01-09 PROCEDURE — 95165 ANTIGEN THERAPY SERVICES: CPT | Performed by: ALLERGY & IMMUNOLOGY

## 2024-01-23 ENCOUNTER — NURSE ONLY (OUTPATIENT)
Dept: ALLERGY | Facility: CLINIC | Age: 49
End: 2024-01-23

## 2024-01-23 DIAGNOSIS — J30.89 ENVIRONMENTAL AND SEASONAL ALLERGIES: Primary | ICD-10-CM

## 2024-01-23 PROCEDURE — 95117 IMMUNOTHERAPY INJECTIONS: CPT | Performed by: ALLERGY & IMMUNOLOGY

## 2024-01-23 PROCEDURE — 95165 ANTIGEN THERAPY SERVICES: CPT | Performed by: ALLERGY & IMMUNOLOGY

## 2024-01-25 ENCOUNTER — HOSPITAL ENCOUNTER (OUTPATIENT)
Age: 49
Discharge: HOME | End: 2024-01-25
Payer: COMMERCIAL

## 2024-01-25 VITALS
SYSTOLIC BLOOD PRESSURE: 127 MMHG | DIASTOLIC BLOOD PRESSURE: 84 MMHG | OXYGEN SATURATION: 98 % | HEART RATE: 93 BPM | TEMPERATURE: 99 F | RESPIRATION RATE: 18 BRPM

## 2024-01-25 DIAGNOSIS — J06.9 VIRAL URI: ICD-10-CM

## 2024-01-25 DIAGNOSIS — H10.9 CONJUNCTIVITIS OF BOTH EYES, UNSPECIFIED CONJUNCTIVITIS TYPE: Primary | ICD-10-CM

## 2024-01-25 LAB — S PYO AG THROAT QL: NEGATIVE

## 2024-01-25 PROCEDURE — 99213 OFFICE O/P EST LOW 20 MIN: CPT | Performed by: NURSE PRACTITIONER

## 2024-01-25 PROCEDURE — 87880 STREP A ASSAY W/OPTIC: CPT | Performed by: NURSE PRACTITIONER

## 2024-01-25 RX ORDER — POLYMYXIN B SULFATE AND TRIMETHOPRIM 1; 10000 MG/ML; [USP'U]/ML
1 SOLUTION OPHTHALMIC 4 TIMES DAILY
Qty: 1 EACH | Refills: 0 | Status: SHIPPED | OUTPATIENT
Start: 2024-01-25 | End: 2024-02-01

## 2024-01-26 NOTE — DISCHARGE INSTRUCTIONS
Polytrim eye drops 1 drop to each eye three times per day for 7 days  Return for eye pain, eye swelling, vision changes or worsening symptoms  In 24 hours, wash pillow cases, towels, sheets, etc. in very hot water  If no improvement in 1 week, please see primary care provider     Please take acetaminophen (Tylenol) 650-1000 mg every 6 hours for fever/pain  OR  Ibuprofen (Motrin, Advil) 600 mg every 6 hours for fever/pain, with food  Warm fluids  Throat drops/lozenges e.g. Halls, Cepacol  Warm salt water gargles (1/2 teaspoon of salt to 8 oz of warm water)  Return for any new/worsening symptoms  See your primary care provider if no improvement in 1 week

## 2024-01-26 NOTE — ED PROVIDER NOTES
Chief Complaint   Patient presents with    Cough/URI       HPI:     Anastasia Parry is a 48 year old female who presents for evaluation and management of a chief complaint of sore throat for 3 days, along with bilateral eye redness, purulent drainage, and pruritus.  No eye pain.  No eye trauma.  Does not wear contact lenses.  Reports she has had ongoing cough for several weeks, recently treated for bronchitis in December, and pneumonia prior to that. Cough is improved, but still present.  She has had no chest pain or shortness of breath.  No fever.  No nausea, vomiting, diarrhea, or abdominal pain.    PFSH  PFSH asessment screens reviewed and agree.  Nursing note reviewed and I agree with documentation.    Family History   Problem Relation Age of Onset    Diabetes Father     Macular degeneration Mother     Diabetes Sister     Cataracts Other     Hypertension Other     Breast Cancer Maternal Aunt         30-35     Family history reviewed with patient/caregiver and is not pertinent to presenting problem.  Social History     Socioeconomic History    Marital status:      Spouse name: Not on file    Number of children: Not on file    Years of education: Not on file    Highest education level: Not on file   Occupational History    Not on file   Tobacco Use    Smoking status: Former    Smokeless tobacco: Never   Substance and Sexual Activity    Alcohol use: Yes     Comment: rarely    Drug use: Never    Sexual activity: Not on file   Other Topics Concern    Grew up on a farm Not Asked    History of tanning Not Asked    Outdoor occupation Not Asked    Breast feeding No    Reaction to local anesthetic No     Service Not Asked    Blood Transfusions Not Asked    Caffeine Concern Yes    Occupational Exposure Not Asked    Hobby Hazards Not Asked    Sleep Concern Not Asked    Stress Concern Not Asked    Weight Concern Not Asked    Special Diet Not Asked    Back Care Not Asked    Exercise Yes    Bike Helmet Not Asked     Seat Belt Not Asked    Self-Exams Not Asked    Pt has a pacemaker No    Pt has a defibrillator Not Asked   Social History Narrative    The patient does not use an assistive device..      The patient does live in a home with stairs.     Social Determinants of Health     Financial Resource Strain: Not on file   Food Insecurity: Not on file   Transportation Needs: Not on file   Physical Activity: Not on file   Stress: Not on file   Social Connections: Not on file   Housing Stability: Not on file        Findings:    /84   Pulse 93   Temp 98.6 °F (37 °C) (Temporal)   Resp 18   LMP 12/15/2023 (Approximate)   SpO2 98%   GENERAL: well developed, well nourished, well hydrated, no distress  HEAD: normocephalic  NECK: supple, no adenopathy  EYES: sclera non icteric bilateral, conjunctiva injected bilaterally. RENAE and EOMI bilaterally.   EARS: TM  bilateral: normal  NOSE: nasal turbinates: swollen, red, and clear drainage  THROAT: clear, without exudates  CARDIO: RRR without murmur  LUNGS: clear to auscultation bilaterally; no rales, rhonchi, or wheezes  SKIN: good skin turgor, no obvious rashes    MDM/Assessment/Plan:   Orders for this encounter:  Orders Placed This Encounter    POCT Rapid Strep    POCT Rapid Strep    polymyxin B-trimethoprim 20902-2.1 UNIT/ML-% Ophthalmic Solution     Sig: Place 1 drop into both eyes in the morning, at noon, in the evening, and at bedtime for 7 days.     Dispense:  1 each     Refill:  0       Labs performed this visit:  Recent Results (from the past 10 hour(s))   POCT Rapid Strep    Collection Time: 01/25/24  6:23 PM   Result Value Ref Range    POCT Rapid Strep Negative Negative       MDM:   Medical Decision Making  HPI and exam consistent with bilateral conjunctivitis, along with viral URI. Will start Polytrim.  Her rapid strep test is negative.  Her lungs are clear, low suspicion for pneumonia.  Did offer patient chest x-ray, she declined.  Discussed supportive care.   Discussed return precautions.  Advised follow-up with primary care provider in 1 week if no improvement.  Patient verbalized understanding with plan of care.    Amount and/or Complexity of Data Reviewed  Labs: ordered. Decision-making details documented in ED Course.     Details: Strep negative     Risk  OTC drugs.  Prescription drug management.        Diagnosis:    ICD-10-CM    1. Conjunctivitis of both eyes, unspecified conjunctivitis type  H10.9       2. Viral URI  J06.9           All results reviewed and discussed with patient.  See AVS for detailed discharge instructions for your condition today.    Follow Up with:  No follow-up provider specified.

## 2024-02-06 ENCOUNTER — NURSE ONLY (OUTPATIENT)
Dept: ALLERGY | Facility: CLINIC | Age: 49
End: 2024-02-06

## 2024-02-06 DIAGNOSIS — J30.89 ENVIRONMENTAL AND SEASONAL ALLERGIES: Primary | ICD-10-CM

## 2024-02-06 PROCEDURE — 95117 IMMUNOTHERAPY INJECTIONS: CPT | Performed by: ALLERGY & IMMUNOLOGY

## 2024-02-13 ENCOUNTER — TELEPHONE (OUTPATIENT)
Dept: ALLERGY | Facility: CLINIC | Age: 49
End: 2024-02-13

## 2024-02-13 ENCOUNTER — NURSE ONLY (OUTPATIENT)
Dept: ALLERGY | Facility: CLINIC | Age: 49
End: 2024-02-13

## 2024-02-13 VITALS — OXYGEN SATURATION: 100 % | HEART RATE: 66 BPM | DIASTOLIC BLOOD PRESSURE: 81 MMHG | SYSTOLIC BLOOD PRESSURE: 107 MMHG

## 2024-02-13 DIAGNOSIS — J30.89 ENVIRONMENTAL AND SEASONAL ALLERGIES: Primary | ICD-10-CM

## 2024-02-13 DIAGNOSIS — T80.89XA ALLERGIC REACTION AFTER ALLERGEN IMMUNOTHERAPY: ICD-10-CM

## 2024-02-13 DIAGNOSIS — T45.0X5A ALLERGIC REACTION AFTER ALLERGEN IMMUNOTHERAPY: ICD-10-CM

## 2024-02-13 PROCEDURE — 95117 IMMUNOTHERAPY INJECTIONS: CPT | Performed by: ALLERGY & IMMUNOLOGY

## 2024-02-13 RX ORDER — DIPHENHYDRAMINE HYDROCHLORIDE 12.5 MG/5ML
25 SOLUTION ORAL ONCE
Status: COMPLETED | OUTPATIENT
Start: 2024-02-13 | End: 2024-02-13

## 2024-02-13 RX ADMIN — DIPHENHYDRAMINE HYDROCHLORIDE 25 MG: 12.5 SOLUTION ORAL at 10:15:00

## 2024-02-13 NOTE — TELEPHONE ENCOUNTER
Patient with allergic reaction after immunotherapy today  After 30 minutes post injections patient with  increased congestion, itchy/watery eyes, and redness on neck  Patient unsure if redness was there before injections, believes it was due to her jacket rubbing on her neck  No hives, shortness of breath, chest tightness, etc.   Patient received red to all ragweed, tree, weeds and dog 0.30 and grass and cat 0.20  Patient was given 10 mL of benadryl was observed for 30 more minutes after administration  Please see allergy systemic reaction flowsheet for more information  Symptoms resolved after benadryl and patient felt well enough to be discharged after 30 minutes after benadryl  Dr. Garcia may be have orders for benadryl administration please?  Patient aware that if symptoms worsen or return to go to ER/urgent care for further treatment   Patient discharged per MD

## 2024-02-27 ENCOUNTER — NURSE ONLY (OUTPATIENT)
Dept: ALLERGY | Facility: CLINIC | Age: 49
End: 2024-02-27

## 2024-02-27 DIAGNOSIS — J30.89 ENVIRONMENTAL AND SEASONAL ALLERGIES: Primary | ICD-10-CM

## 2024-02-27 PROCEDURE — 95117 IMMUNOTHERAPY INJECTIONS: CPT | Performed by: ALLERGY & IMMUNOLOGY

## 2024-02-27 PROCEDURE — 95165 ANTIGEN THERAPY SERVICES: CPT | Performed by: ALLERGY & IMMUNOLOGY

## 2024-03-19 ENCOUNTER — NURSE ONLY (OUTPATIENT)
Dept: ALLERGY | Facility: CLINIC | Age: 49
End: 2024-03-19

## 2024-03-19 DIAGNOSIS — J30.89 ENVIRONMENTAL AND SEASONAL ALLERGIES: Primary | ICD-10-CM

## 2024-03-19 PROCEDURE — 95117 IMMUNOTHERAPY INJECTIONS: CPT | Performed by: ALLERGY & IMMUNOLOGY

## 2024-03-19 PROCEDURE — 95165 ANTIGEN THERAPY SERVICES: CPT | Performed by: ALLERGY & IMMUNOLOGY

## 2024-04-07 NOTE — TELEPHONE ENCOUNTER
Patient outreach message received. \"Entered into Epic. Recall h pylori eradication testing for 8 weeks per Radha Evans, horace.  Refer to patient message 07/06/2021, for h pylori education and colonoscopy/egd results and recommendations reviewed w Abdominal Pain, N/V/D

## 2024-04-16 ENCOUNTER — NURSE ONLY (OUTPATIENT)
Dept: ALLERGY | Facility: CLINIC | Age: 49
End: 2024-04-16
Payer: COMMERCIAL

## 2024-04-16 DIAGNOSIS — J30.89 ENVIRONMENTAL AND SEASONAL ALLERGIES: Primary | ICD-10-CM

## 2024-04-16 PROCEDURE — 95117 IMMUNOTHERAPY INJECTIONS: CPT | Performed by: ALLERGY & IMMUNOLOGY

## 2024-05-14 ENCOUNTER — NURSE ONLY (OUTPATIENT)
Dept: ALLERGY | Facility: CLINIC | Age: 49
End: 2024-05-14

## 2024-05-14 DIAGNOSIS — J30.89 ENVIRONMENTAL AND SEASONAL ALLERGIES: Primary | ICD-10-CM

## 2024-05-14 PROCEDURE — 95117 IMMUNOTHERAPY INJECTIONS: CPT | Performed by: ALLERGY & IMMUNOLOGY

## 2024-06-11 ENCOUNTER — NURSE ONLY (OUTPATIENT)
Dept: ALLERGY | Facility: CLINIC | Age: 49
End: 2024-06-11

## 2024-06-11 DIAGNOSIS — J30.89 ENVIRONMENTAL AND SEASONAL ALLERGIES: Primary | ICD-10-CM

## 2024-06-11 PROCEDURE — 95117 IMMUNOTHERAPY INJECTIONS: CPT | Performed by: ALLERGY & IMMUNOLOGY

## 2024-06-11 PROCEDURE — 95165 ANTIGEN THERAPY SERVICES: CPT | Performed by: ALLERGY & IMMUNOLOGY

## 2024-07-09 ENCOUNTER — NURSE ONLY (OUTPATIENT)
Dept: ALLERGY | Facility: CLINIC | Age: 49
End: 2024-07-09

## 2024-07-09 DIAGNOSIS — J30.89 ENVIRONMENTAL AND SEASONAL ALLERGIES: Primary | ICD-10-CM

## 2024-07-09 PROCEDURE — 95165 ANTIGEN THERAPY SERVICES: CPT | Performed by: ALLERGY & IMMUNOLOGY

## 2024-07-09 PROCEDURE — 95117 IMMUNOTHERAPY INJECTIONS: CPT | Performed by: ALLERGY & IMMUNOLOGY

## 2024-07-20 ENCOUNTER — APPOINTMENT (OUTPATIENT)
Dept: GENERAL RADIOLOGY | Age: 49
End: 2024-07-20
Attending: EMERGENCY MEDICINE

## 2024-07-20 ENCOUNTER — HOSPITAL ENCOUNTER (EMERGENCY)
Age: 49
Discharge: HOME | End: 2024-07-20
Attending: EMERGENCY MEDICINE

## 2024-07-20 VITALS
HEIGHT: 64 IN | WEIGHT: 178 LBS | RESPIRATION RATE: 16 BRPM | SYSTOLIC BLOOD PRESSURE: 101 MMHG | DIASTOLIC BLOOD PRESSURE: 69 MMHG | OXYGEN SATURATION: 97 % | HEART RATE: 76 BPM | BODY MASS INDEX: 30.39 KG/M2 | TEMPERATURE: 97.5 F

## 2024-07-20 DIAGNOSIS — M25.471 SWELLING OF BOTH ANKLES: Primary | ICD-10-CM

## 2024-07-20 DIAGNOSIS — M25.472 SWELLING OF BOTH ANKLES: Primary | ICD-10-CM

## 2024-07-20 LAB
ALBUMIN SERPL-MCNC: 3.6 G/DL (ref 3.6–5.1)
ALBUMIN/GLOB SERPL: 0.9 {RATIO} (ref 1–2.4)
ALP SERPL-CCNC: 49 UNITS/L (ref 45–117)
ALT SERPL-CCNC: 29 UNITS/L
ANION GAP SERPL CALC-SCNC: 10 MMOL/L (ref 7–19)
AST SERPL-CCNC: 15 UNITS/L
ATRIAL RATE (BPM): 78
BASOPHILS # BLD: 0 K/MCL (ref 0–0.3)
BASOPHILS NFR BLD: 0 %
BILIRUB SERPL-MCNC: 0.6 MG/DL (ref 0.2–1)
BUN SERPL-MCNC: 15 MG/DL (ref 6–20)
BUN/CREAT SERPL: 25 (ref 7–25)
CALCIUM SERPL-MCNC: 8.9 MG/DL (ref 8.4–10.2)
CHLORIDE SERPL-SCNC: 108 MMOL/L (ref 97–110)
CO2 SERPL-SCNC: 24 MMOL/L (ref 21–32)
CREAT SERPL-MCNC: 0.6 MG/DL (ref 0.51–0.95)
D DIMER PPP FEU-MCNC: 0.26 MG/L (FEU)
DEPRECATED RDW RBC: 37.9 FL (ref 39–50)
EGFRCR SERPLBLD CKD-EPI 2021: >90 ML/MIN/{1.73_M2}
EOSINOPHIL # BLD: 0.1 K/MCL (ref 0–0.5)
EOSINOPHIL NFR BLD: 1 %
ERYTHROCYTE [DISTWIDTH] IN BLOOD: 13.2 % (ref 11–15)
FASTING DURATION TIME PATIENT: ABNORMAL H
GLOBULIN SER-MCNC: 3.8 G/DL (ref 2–4)
GLUCOSE SERPL-MCNC: 96 MG/DL (ref 70–99)
HCT VFR BLD CALC: 41 % (ref 36–46.5)
HGB BLD-MCNC: 13.6 G/DL (ref 12–15.5)
IMM GRANULOCYTES # BLD AUTO: 0 K/MCL (ref 0–0.2)
IMM GRANULOCYTES # BLD: 0 %
LYMPHOCYTES # BLD: 2.5 K/MCL (ref 1–4.8)
LYMPHOCYTES NFR BLD: 36 %
MCH RBC QN AUTO: 26.7 PG (ref 26–34)
MCHC RBC AUTO-ENTMCNC: 33.2 G/DL (ref 32–36.5)
MCV RBC AUTO: 80.6 FL (ref 78–100)
MONOCYTES # BLD: 0.6 K/MCL (ref 0.3–0.9)
MONOCYTES NFR BLD: 9 %
NEUTROPHILS # BLD: 3.8 K/MCL (ref 1.8–7.7)
NEUTROPHILS NFR BLD: 54 %
NRBC BLD MANUAL-RTO: 0 /100 WBC
NT-PROBNP SERPL-MCNC: 32 PG/ML
P AXIS (DEGREES): 15
PLATELET # BLD AUTO: 313 K/MCL (ref 140–450)
POTASSIUM SERPL-SCNC: 3.7 MMOL/L (ref 3.4–5.1)
PR-INTERVAL (MSEC): 164
PROT SERPL-MCNC: 7.4 G/DL (ref 6.4–8.2)
QRS-INTERVAL (MSEC): 84
QT-INTERVAL (MSEC): 388
QTC: 442
R AXIS (DEGREES): 36
RBC # BLD: 5.09 MIL/MCL (ref 4–5.2)
REPORT TEXT: NORMAL
SODIUM SERPL-SCNC: 138 MMOL/L (ref 135–145)
T AXIS (DEGREES): 16
TROPONIN I SERPL DL<=0.01 NG/ML-MCNC: <4 NG/L
VENTRICULAR RATE EKG/MIN (BPM): 78
WBC # BLD: 7 K/MCL (ref 4.2–11)

## 2024-07-20 PROCEDURE — 99285 EMERGENCY DEPT VISIT HI MDM: CPT

## 2024-07-20 PROCEDURE — 93005 ELECTROCARDIOGRAM TRACING: CPT | Performed by: EMERGENCY MEDICINE

## 2024-07-20 PROCEDURE — 93010 ELECTROCARDIOGRAM REPORT: CPT | Performed by: INTERNAL MEDICINE

## 2024-07-20 PROCEDURE — 71046 X-RAY EXAM CHEST 2 VIEWS: CPT

## 2024-07-20 PROCEDURE — 80053 COMPREHEN METABOLIC PANEL: CPT | Performed by: EMERGENCY MEDICINE

## 2024-07-20 PROCEDURE — 83880 ASSAY OF NATRIURETIC PEPTIDE: CPT | Performed by: EMERGENCY MEDICINE

## 2024-07-20 PROCEDURE — 84484 ASSAY OF TROPONIN QUANT: CPT | Performed by: EMERGENCY MEDICINE

## 2024-07-20 PROCEDURE — 36415 COLL VENOUS BLD VENIPUNCTURE: CPT

## 2024-07-20 PROCEDURE — 85025 COMPLETE CBC W/AUTO DIFF WBC: CPT | Performed by: EMERGENCY MEDICINE

## 2024-07-20 PROCEDURE — 85379 FIBRIN DEGRADATION QUANT: CPT | Performed by: EMERGENCY MEDICINE

## 2024-07-20 ASSESSMENT — PAIN SCALES - GENERAL
PAINLEVEL_OUTOF10: 4
PAINLEVEL_OUTOF10: 2

## 2024-07-21 LAB
ATRIAL RATE (BPM): 78
P AXIS (DEGREES): 15
PR-INTERVAL (MSEC): 164
QRS-INTERVAL (MSEC): 84
QT-INTERVAL (MSEC): 388
QTC: 442
R AXIS (DEGREES): 36
REPORT TEXT: NORMAL
T AXIS (DEGREES): 16
VENTRICULAR RATE EKG/MIN (BPM): 78

## 2024-08-06 ENCOUNTER — NURSE ONLY (OUTPATIENT)
Dept: ALLERGY | Facility: CLINIC | Age: 49
End: 2024-08-06

## 2024-08-06 DIAGNOSIS — J30.89 ENVIRONMENTAL AND SEASONAL ALLERGIES: Primary | ICD-10-CM

## 2024-08-06 DIAGNOSIS — M25.572 PAIN IN JOINT INVOLVING ANKLE AND FOOT, LEFT: Primary | ICD-10-CM

## 2024-08-06 DIAGNOSIS — M25.571 PAIN IN JOINT INVOLVING ANKLE AND FOOT, RIGHT: ICD-10-CM

## 2024-08-06 PROCEDURE — 95117 IMMUNOTHERAPY INJECTIONS: CPT | Performed by: ALLERGY & IMMUNOLOGY

## 2024-08-09 ENCOUNTER — APPOINTMENT (OUTPATIENT)
Dept: OBGYN | Age: 49
End: 2024-08-09

## 2024-09-05 ENCOUNTER — NURSE ONLY (OUTPATIENT)
Dept: ALLERGY | Facility: CLINIC | Age: 49
End: 2024-09-05

## 2024-09-05 DIAGNOSIS — J30.89 ENVIRONMENTAL AND SEASONAL ALLERGIES: Primary | ICD-10-CM

## 2024-09-05 PROCEDURE — 95117 IMMUNOTHERAPY INJECTIONS: CPT | Performed by: ALLERGY & IMMUNOLOGY

## 2024-09-16 ENCOUNTER — HOSPITAL ENCOUNTER (OUTPATIENT)
Dept: GENERAL RADIOLOGY | Age: 49
Discharge: HOME | End: 2024-09-16
Attending: INTERNAL MEDICINE

## 2024-09-16 DIAGNOSIS — M25.571 PAIN IN JOINT INVOLVING ANKLE AND FOOT, RIGHT: ICD-10-CM

## 2024-09-16 DIAGNOSIS — M25.572 PAIN IN JOINT INVOLVING ANKLE AND FOOT, LEFT: ICD-10-CM

## 2024-09-16 PROCEDURE — 73610 X-RAY EXAM OF ANKLE: CPT

## 2024-09-20 ENCOUNTER — CLINICAL ABSTRACT (OUTPATIENT)
Dept: FAMILY MEDICINE | Age: 49
End: 2024-09-20

## 2024-10-01 ENCOUNTER — NURSE ONLY (OUTPATIENT)
Dept: ALLERGY | Facility: CLINIC | Age: 49
End: 2024-10-01

## 2024-10-01 DIAGNOSIS — J30.89 ENVIRONMENTAL AND SEASONAL ALLERGIES: Primary | ICD-10-CM

## 2024-10-01 PROCEDURE — 95117 IMMUNOTHERAPY INJECTIONS: CPT | Performed by: ALLERGY & IMMUNOLOGY

## 2024-10-01 PROCEDURE — 95165 ANTIGEN THERAPY SERVICES: CPT | Performed by: ALLERGY & IMMUNOLOGY

## 2024-10-03 ENCOUNTER — HOSPITAL ENCOUNTER (OUTPATIENT)
Dept: MAMMOGRAPHY | Age: 49
Discharge: HOME | End: 2024-10-03
Attending: INTERNAL MEDICINE

## 2024-10-03 DIAGNOSIS — Z12.31 VISIT FOR SCREENING MAMMOGRAM: ICD-10-CM

## 2024-10-03 PROCEDURE — 77067 SCR MAMMO BI INCL CAD: CPT

## 2024-10-31 ENCOUNTER — NURSE ONLY (OUTPATIENT)
Dept: ALLERGY | Facility: CLINIC | Age: 49
End: 2024-10-31

## 2024-10-31 ENCOUNTER — TELEPHONE (OUTPATIENT)
Dept: ALLERGY | Facility: CLINIC | Age: 49
End: 2024-10-31

## 2024-10-31 DIAGNOSIS — J30.89 ENVIRONMENTAL AND SEASONAL ALLERGIES: Primary | ICD-10-CM

## 2024-10-31 PROCEDURE — 95117 IMMUNOTHERAPY INJECTIONS: CPT | Performed by: ALLERGY & IMMUNOLOGY

## 2024-10-31 PROCEDURE — 95165 ANTIGEN THERAPY SERVICES: CPT | Performed by: ALLERGY & IMMUNOLOGY

## 2024-10-31 NOTE — TELEPHONE ENCOUNTER
Patient is requesting clarification as to when she may reschedule her allergy shot ; what window timeframe does patient have.    Patient mentions she cancelled her appointment for 10/29/24.      Patient mentions to disregard this message.  Patient rescheduled for today 10/31/24

## 2024-11-12 ENCOUNTER — V-VISIT (OUTPATIENT)
Dept: URGENT CARE | Age: 49
End: 2024-11-12

## 2024-11-12 VITALS
DIASTOLIC BLOOD PRESSURE: 77 MMHG | RESPIRATION RATE: 20 BRPM | HEIGHT: 64 IN | SYSTOLIC BLOOD PRESSURE: 108 MMHG | TEMPERATURE: 98.1 F | OXYGEN SATURATION: 98 % | BODY MASS INDEX: 30.22 KG/M2 | WEIGHT: 177 LBS | HEART RATE: 103 BPM

## 2024-11-12 DIAGNOSIS — J02.9 SORE THROAT: Primary | ICD-10-CM

## 2024-11-12 DIAGNOSIS — H61.21 IMPACTED CERUMEN OF RIGHT EAR: ICD-10-CM

## 2024-11-12 DIAGNOSIS — J06.9 VIRAL UPPER RESPIRATORY TRACT INFECTION: ICD-10-CM

## 2024-11-12 LAB
FLUAV AG UPPER RESP QL IA.RAPID: NEGATIVE
FLUBV AG UPPER RESP QL IA.RAPID: NEGATIVE
INTERNAL PROCEDURAL CONTROLS ACCEPTABLE: YES
S PYO AG THROAT QL IA.RAPID: NEGATIVE
SARS-COV+SARS-COV-2 AG RESP QL IA.RAPID: NOT DETECTED
TEST LOT EXPIRATION DATE: NORMAL
TEST LOT EXPIRATION DATE: NORMAL
TEST LOT NUMBER: NORMAL
TEST LOT NUMBER: NORMAL

## 2024-11-12 PROCEDURE — 87428 SARSCOV & INF VIR A&B AG IA: CPT | Performed by: NURSE PRACTITIONER

## 2024-11-12 PROCEDURE — 99203 OFFICE O/P NEW LOW 30 MIN: CPT | Performed by: NURSE PRACTITIONER

## 2024-11-12 PROCEDURE — 87880 STREP A ASSAY W/OPTIC: CPT | Performed by: NURSE PRACTITIONER

## 2024-11-12 RX ORDER — CALCIUM CARBONATE 500 MG/1
1 TABLET, CHEWABLE ORAL
COMMUNITY

## 2024-11-12 RX ORDER — SERTRALINE HYDROCHLORIDE 25 MG/1
25 TABLET, FILM COATED ORAL DAILY
COMMUNITY

## 2024-11-12 RX ORDER — CYCLOBENZAPRINE HCL 10 MG
TABLET ORAL
COMMUNITY

## 2024-11-12 ASSESSMENT — ENCOUNTER SYMPTOMS
COUGH: 1
HEMATOLOGIC/LYMPHATIC NEGATIVE: 1
EYES NEGATIVE: 1
PSYCHIATRIC NEGATIVE: 1
FEVER: 1
GASTROINTESTINAL NEGATIVE: 1
ENDOCRINE NEGATIVE: 1
NEUROLOGICAL NEGATIVE: 1

## 2024-12-02 ENCOUNTER — NURSE ONLY (OUTPATIENT)
Dept: ALLERGY | Facility: CLINIC | Age: 49
End: 2024-12-02

## 2024-12-02 DIAGNOSIS — J30.89 ENVIRONMENTAL AND SEASONAL ALLERGIES: Primary | ICD-10-CM

## 2024-12-02 PROCEDURE — 95117 IMMUNOTHERAPY INJECTIONS: CPT | Performed by: ALLERGY & IMMUNOLOGY

## 2024-12-19 ENCOUNTER — NURSE ONLY (OUTPATIENT)
Dept: ALLERGY | Facility: CLINIC | Age: 49
End: 2024-12-19

## 2024-12-19 ENCOUNTER — OFFICE VISIT (OUTPATIENT)
Dept: ALLERGY | Facility: CLINIC | Age: 49
End: 2024-12-19

## 2024-12-19 VITALS — HEIGHT: 64 IN | WEIGHT: 174 LBS | OXYGEN SATURATION: 98 % | HEART RATE: 82 BPM | BODY MASS INDEX: 29.71 KG/M2

## 2024-12-19 DIAGNOSIS — J30.89 SEASONAL AND PERENNIAL ALLERGIC RHINOCONJUNCTIVITIS: Primary | ICD-10-CM

## 2024-12-19 DIAGNOSIS — Z23 FLU VACCINE NEED: ICD-10-CM

## 2024-12-19 DIAGNOSIS — J34.2 NASAL SEPTAL DEVIATION: ICD-10-CM

## 2024-12-19 DIAGNOSIS — J30.2 SEASONAL AND PERENNIAL ALLERGIC RHINOCONJUNCTIVITIS: Primary | ICD-10-CM

## 2024-12-19 DIAGNOSIS — H10.10 SEASONAL AND PERENNIAL ALLERGIC RHINOCONJUNCTIVITIS: Primary | ICD-10-CM

## 2024-12-19 DIAGNOSIS — J30.89 ENVIRONMENTAL AND SEASONAL ALLERGIES: Primary | ICD-10-CM

## 2024-12-19 DIAGNOSIS — L20.81 ATOPIC NEURODERMATITIS: ICD-10-CM

## 2024-12-19 DIAGNOSIS — Z23 NEED FOR COVID-19 VACCINE: ICD-10-CM

## 2024-12-19 PROCEDURE — 3008F BODY MASS INDEX DOCD: CPT | Performed by: ALLERGY & IMMUNOLOGY

## 2024-12-19 PROCEDURE — 95117 IMMUNOTHERAPY INJECTIONS: CPT | Performed by: ALLERGY & IMMUNOLOGY

## 2024-12-19 PROCEDURE — 99214 OFFICE O/P EST MOD 30 MIN: CPT | Performed by: ALLERGY & IMMUNOLOGY

## 2024-12-19 RX ORDER — METHYLPREDNISOLONE 4 MG/1
TABLET ORAL
Qty: 21 TABLET | Refills: 0 | Status: SHIPPED | OUTPATIENT
Start: 2024-12-19

## 2024-12-19 NOTE — PROGRESS NOTES
Anastasia Parry is a 49 year old female.    HPI:   No chief complaint on file.    Patient is a 49-year-old female who presents for follow-up with a chief complaint of allergies and eczema  Patient last seen by me in December 2023  History of allergic rhinitis.  Currently on maintenance dose immunotherapy every 4 weeks to trees grass ragweed weeds cats and dogs    Medication list include Xyzal Sudafed Flonase    Immunizations reviewed.  COVID-vaccine x 4 doses last in October 2023  No flu vaccine on record for the past few years    Today patient reports    Ar:  Active or persistent symptoms: some nc   No fever or MP   Add pse and mucinex  Active meds:  xyzal flonase   pets :  dog   No issues with ait   Ait helping  Ait today      Atopic dermatitis  Stable at this time           HISTORY:  Past Medical History:    Allergic rhinitis    Chronic allergic conjunctivitis    Dry eye syndrome of both eyes    Hx of LASIK    Paresis of accommodation, bilateral      Past Surgical History:   Procedure Laterality Date    Colonoscopy N/A 6/30/2021    Procedure: COLONOSCOPY with biopsy;  Surgeon: Janet Álvarez MD;  Location: [facility] ENDO    Lasik Bilateral 1999    [place]    Other surgical history      Sinus surgery    2016      Family History   Problem Relation Age of Onset    Diabetes Father     Macular degeneration Mother     Diabetes Sister     Cataracts Other     Hypertension Other     Breast Cancer Maternal Aunt         30-35      Social History:   Social History     Socioeconomic History    Marital status:    Tobacco Use    Smoking status: Former    Smokeless tobacco: Never   Substance and Sexual Activity    Alcohol use: Yes     Comment: rarely    Drug use: Never   Other Topics Concern    Breast feeding No    Reaction to local anesthetic No    Caffeine Concern Yes    Exercise Yes    Pt has a pacemaker No   Social History Narrative    The patient does not use an assistive device..      The patient does  live in a home with stairs.        Medications (Active prior to today's visit):  Current Outpatient Medications   Medication Sig Dispense Refill    tiZANidine HCl 4 MG Oral Cap Take 1 capsule (4 mg total) by mouth 3 (three) times daily as needed for Muscle spasms. 10 capsule 0    Fluocinonide 0.05 % External Solution Use twice daily Monday-Friday with flares. Do not use on face. 60 mL 5    minoxidil 2.5 MG Oral Tab Take 1 tablet (2.5 mg total) by mouth daily. 180 tablet 0    MINOXIDIL 2.5 MG Oral Tab TAKE 1/2 TABLET BY MOUTH DAILY 30 tablet 0    Clindamycin Phosphate 1 % External Gel Use up to twice daily for new pimples on scalp 30 g 2    Amphetamine-Dextroamphet ER 20 MG Oral Capsule SR 24 Hr Take 1 capsule (20 mg total) by mouth every morning.      naproxen 500 MG Oral Tab Take 1 tablet (500 mg total) by mouth as needed.         Allergies:  Allergies[1]      ROS:   Allergic/Immuno:  See hpi  Cardiovascular:  Negative for irregular heartbeat/palpitations, chest pain, edema  Constitutional:  Negative night sweats,weight loss, irritability and lethargy  ENMT:  Negative for ear drainage, hearing loss and nasal drainage  Eyes:  Negative for eye discharge and vision loss  Gastrointestinal:  Negative for abdominal pain, diarrhea and vomiting  Integumentary:  Negative for pruritus and rash  Respiratory:  Negative for cough, dyspnea and wheezing    PHYSICAL EXAM:   Constitutional: responsive, no acute distress noted  Head/Face: NC/Atraumatic  Eyes/Vision: conjunctiva and lids are normal extraocular motion is intact   Ears/Audiometry: tympanic membranes are normal bilaterally hearing is grossly intact  Nose/Mouth/Throat: nose and throat are clear mucous membranes are moist   Neck/Thyroid: neck is supple without adenopathy  Lymphatic: no abnormal cervical, supraclavicular or axillary adenopathy is noted  Respiratory: normal to inspection lungs are clear to auscultation bilaterally normal respiratory effort   Cardiovascular:  regular rate and rhythm no murmurs, gallups, or rubs  Abdomen: soft non-tender non-distended  Skin/Hair: no unusual rashes present   Extremities: no edema, cyanosis, or clubbing     ASSESSMENT/PLAN:   Assessment   Encounter Diagnoses   Name Primary?    Seasonal and perennial allergic rhinoconjunctivitis Yes    Nasal septal deviation     Atopic neurodermatitis     Flu vaccine need     Need for COVID-19 vaccine        #1 allergic rhinitis  Immunotherapy in office today followed by 30 minutes of observation.  Without issue or incident  Continue with Xyzal and Flonase  Sudafed as needed  Start Medrol Dosepak given recent symptoms of nasal and sinus congestion.  No fevers or mucopurulence.  If symptoms last longer than 10 days and if is associated with fevers or colored drainage will consider calling an antibiotic  Sinus rinses as needed    2.  Nasal septal deviation  May consider ent eval    3.  Atopic dermatitis  Stable at this time  Continue with moisturizers  Topical steroids including hydrocortisone as needed based upon the read and referral okay perfect alright see you at that    4.  Flu vaccine recommended and offered  Utd per pt     5.  COVID-vaccine booster recommended.  Please check with local pharmacy as we do not stock the vaccine in office.  Most recent boosters in September 2024         Orders This Visit:  No orders of the defined types were placed in this encounter.      Meds This Visit:  Requested Prescriptions      No prescriptions requested or ordered in this encounter       Imaging & Referrals:  None     12/19/2024  Armando Garcia MD    If medication samples were provided today, they were provided solely for patient education and training related to self administration of these medications.  Teaching, instruction and sample was provided to the patient by myself.  Teaching included  a review of potential adverse side effects as well as potential efficacy.  Patient's questions were answered in regards  to medication administration and dosing and potential side effects. Teaching was provided via the teach back method           [1]   Allergies  Allergen Reactions    Eucalyptus Oil SHORTNESS OF BREATH and UNKNOWN    Codeine UNKNOWN    Pollen Extract UNKNOWN

## 2025-04-21 ENCOUNTER — OFFICE VISIT (OUTPATIENT)
Dept: INTERNAL MEDICINE CLINIC | Facility: CLINIC | Age: 50
End: 2025-04-21
Payer: COMMERCIAL

## 2025-04-21 VITALS
OXYGEN SATURATION: 99 % | BODY MASS INDEX: 29.71 KG/M2 | TEMPERATURE: 98 F | WEIGHT: 174 LBS | DIASTOLIC BLOOD PRESSURE: 79 MMHG | SYSTOLIC BLOOD PRESSURE: 113 MMHG | RESPIRATION RATE: 18 BRPM | HEART RATE: 81 BPM | HEIGHT: 64 IN

## 2025-04-21 DIAGNOSIS — E55.9 VITAMIN D DEFICIENCY: ICD-10-CM

## 2025-04-21 DIAGNOSIS — G56.00 CARPAL TUNNEL SYNDROME, UNSPECIFIED LATERALITY: Primary | ICD-10-CM

## 2025-04-21 DIAGNOSIS — D51.8 MACROCYTIC ANEMIA WITH VITAMIN B12 DEFICIENCY: ICD-10-CM

## 2025-04-21 DIAGNOSIS — Z00.00 ANNUAL PHYSICAL EXAM: ICD-10-CM

## 2025-04-21 NOTE — PROGRESS NOTES
Subjective:     Patient ID: Anastasia Parry is a 49 year old female.    HPI    History/Other:   She came in today for follow-up.  She states that she changed insurance that is why she was not seen here last year.  She is due for blood work.  She is not do for mammogram.  She is complaining of some tingling numbness sensation and pain on both hands mostly when she wakes up in the morning.  And at night  Review of Systems   Constitutional: Negative.    HENT: Negative.     Eyes: Negative.    Respiratory: Negative.     Cardiovascular: Negative.    Gastrointestinal: Negative.    Endocrine: Negative.    Genitourinary: Negative.    Musculoskeletal: Negative.    Skin: Negative.    Neurological: Negative.    Hematological: Negative.    Psychiatric/Behavioral: Negative.       Current Medications[1]  Allergies:Allergies[2]    Past Medical History[3]   Past Surgical History[4]   Family History[5]   Social History: Short Social Hx on File[6]     Objective:   Physical Exam  Vitals and nursing note reviewed.   Constitutional:       Appearance: Normal appearance.   HENT:      Head: Normocephalic and atraumatic.   Cardiovascular:      Rate and Rhythm: Normal rate and regular rhythm.      Pulses: Normal pulses.      Heart sounds: Normal heart sounds.   Pulmonary:      Effort: Pulmonary effort is normal.      Breath sounds: Normal breath sounds.   Abdominal:      Palpations: Abdomen is soft.   Musculoskeletal:         General: Normal range of motion.      Cervical back: Normal range of motion and neck supple.   Skin:     General: Skin is warm.   Neurological:      Mental Status: She is alert. Mental status is at baseline.   Psychiatric:         Mood and Affect: Mood normal.         Assessment & Plan:   ?carpal tunnel -I did advise her to use a wrist splint for carpal tunnel , if not better follow up  Blood work  Orders Placed This Encounter   Procedures    CBC W Differential W Platelet [E]    Comp Metabolic Panel (14)    TSH W Reflex  To Free T4 [E]    Lipid Panel [E]    Hemoglobin A1C [E]    Vitamin D [E]       Meds This Visit:  Requested Prescriptions      No prescriptions requested or ordered in this encounter       Imaging & Referrals:  None            [1]   Current Outpatient Medications   Medication Sig Dispense Refill    methylPREDNISolone 4 MG Oral Tablet Therapy Pack Take as directed with food 21 tablet 0    tiZANidine HCl 4 MG Oral Cap Take 1 capsule (4 mg total) by mouth 3 (three) times daily as needed for Muscle spasms. 10 capsule 0    Fluocinonide 0.05 % External Solution Use twice daily Monday-Friday with flares. Do not use on face. 60 mL 5    minoxidil 2.5 MG Oral Tab Take 1 tablet (2.5 mg total) by mouth daily. 180 tablet 0    Clindamycin Phosphate 1 % External Gel Use up to twice daily for new pimples on scalp 30 g 2    naproxen 500 MG Oral Tab Take 1 tablet (500 mg total) by mouth as needed.     [2]   Allergies  Allergen Reactions    Eucalyptus Oil SHORTNESS OF BREATH and UNKNOWN    Codeine UNKNOWN    Pollen Extract UNKNOWN   [3]   Past Medical History:   Allergic rhinitis    Chronic allergic conjunctivitis    Dry eye syndrome of both eyes    Hx of LASIK    Paresis of accommodation, bilateral   [4]   Past Surgical History:  Procedure Laterality Date    Colonoscopy N/A 6/30/2021    Procedure: COLONOSCOPY with biopsy;  Surgeon: Janet Álvarez MD;  Location: [facility] ENDO    [place]ilateral 1999    [place]    Other surgical history      Sinus surgery    2016   [5]   Family History  Problem Relation Age of Onset    Diabetes Father     Macular degeneration Mother     Diabetes Sister     Cataracts Other     Hypertension Other     Breast Cancer Maternal Aunt         30-35   [6]   Social History  Socioeconomic History    Marital status:    Tobacco Use    Smoking status: Former    Smokeless tobacco: Never   Vaping Use    Vaping status: Never Used   Substance and Sexual Activity    Alcohol use: Yes     Comment: rarely     Drug use: Never   Other Topics Concern    Breast feeding No    Reaction to local anesthetic No    Caffeine Concern Yes    Exercise Yes    Pt has a pacemaker No   Social History Narrative    The patient does not use an assistive device..      The patient does live in a home with stairs.

## 2025-05-08 ENCOUNTER — LAB ENCOUNTER (OUTPATIENT)
Dept: LAB | Facility: HOSPITAL | Age: 50
End: 2025-05-08
Attending: INTERNAL MEDICINE
Payer: COMMERCIAL

## 2025-05-08 DIAGNOSIS — E55.9 VITAMIN D DEFICIENCY: ICD-10-CM

## 2025-05-08 DIAGNOSIS — D51.8 MACROCYTIC ANEMIA WITH VITAMIN B12 DEFICIENCY: ICD-10-CM

## 2025-05-08 DIAGNOSIS — Z00.00 ANNUAL PHYSICAL EXAM: ICD-10-CM

## 2025-05-08 LAB
ALBUMIN SERPL-MCNC: 4.4 G/DL (ref 3.2–4.8)
ALBUMIN/GLOB SERPL: 1.5 {RATIO} (ref 1–2)
ALP LIVER SERPL-CCNC: 51 U/L (ref 39–100)
ALT SERPL-CCNC: 15 U/L (ref 10–49)
ANION GAP SERPL CALC-SCNC: 5 MMOL/L (ref 0–18)
AST SERPL-CCNC: 16 U/L (ref ?–34)
BASOPHILS # BLD AUTO: 0.02 X10(3) UL (ref 0–0.2)
BASOPHILS NFR BLD AUTO: 0.3 %
BILIRUB SERPL-MCNC: 0.5 MG/DL (ref 0.3–1.2)
BUN BLD-MCNC: 10 MG/DL (ref 9–23)
BUN/CREAT SERPL: 13.9 (ref 10–20)
CALCIUM BLD-MCNC: 9.3 MG/DL (ref 8.7–10.4)
CHLORIDE SERPL-SCNC: 106 MMOL/L (ref 98–112)
CHOLEST SERPL-MCNC: 191 MG/DL (ref ?–200)
CO2 SERPL-SCNC: 29 MMOL/L (ref 21–32)
CREAT BLD-MCNC: 0.72 MG/DL (ref 0.55–1.02)
DEPRECATED RDW RBC AUTO: 38.2 FL (ref 35.1–46.3)
EGFRCR SERPLBLD CKD-EPI 2021: 104 ML/MIN/1.73M2 (ref 60–?)
EOSINOPHIL # BLD AUTO: 0.08 X10(3) UL (ref 0–0.7)
EOSINOPHIL NFR BLD AUTO: 1.2 %
ERYTHROCYTE [DISTWIDTH] IN BLOOD BY AUTOMATED COUNT: 12.6 % (ref 11–15)
EST. AVERAGE GLUCOSE BLD GHB EST-MCNC: 108 MG/DL (ref 68–126)
FASTING PATIENT LIPID ANSWER: YES
FASTING STATUS PATIENT QL REPORTED: YES
GLOBULIN PLAS-MCNC: 2.9 G/DL (ref 2–3.5)
GLUCOSE BLD-MCNC: 99 MG/DL (ref 70–99)
HBA1C MFR BLD: 5.4 % (ref ?–5.7)
HCT VFR BLD AUTO: 42.2 % (ref 35–48)
HDLC SERPL-MCNC: 46 MG/DL (ref 40–59)
HGB BLD-MCNC: 14 G/DL (ref 12–16)
IMM GRANULOCYTES # BLD AUTO: 0.02 X10(3) UL (ref 0–1)
IMM GRANULOCYTES NFR BLD: 0.3 %
LDLC SERPL CALC-MCNC: 128 MG/DL (ref ?–100)
LYMPHOCYTES # BLD AUTO: 2.18 X10(3) UL (ref 1–4)
LYMPHOCYTES NFR BLD AUTO: 31.8 %
MCH RBC QN AUTO: 27.6 PG (ref 26–34)
MCHC RBC AUTO-ENTMCNC: 33.2 G/DL (ref 31–37)
MCV RBC AUTO: 83.2 FL (ref 80–100)
MONOCYTES # BLD AUTO: 0.52 X10(3) UL (ref 0.1–1)
MONOCYTES NFR BLD AUTO: 7.6 %
NEUTROPHILS # BLD AUTO: 4.04 X10 (3) UL (ref 1.5–7.7)
NEUTROPHILS # BLD AUTO: 4.04 X10(3) UL (ref 1.5–7.7)
NEUTROPHILS NFR BLD AUTO: 58.8 %
NONHDLC SERPL-MCNC: 145 MG/DL (ref ?–130)
OSMOLALITY SERPL CALC.SUM OF ELEC: 289 MOSM/KG (ref 275–295)
PLATELET # BLD AUTO: 323 10(3)UL (ref 150–450)
POTASSIUM SERPL-SCNC: 4 MMOL/L (ref 3.5–5.1)
PROT SERPL-MCNC: 7.3 G/DL (ref 5.7–8.2)
RBC # BLD AUTO: 5.07 X10(6)UL (ref 3.8–5.3)
SODIUM SERPL-SCNC: 140 MMOL/L (ref 136–145)
TRIGL SERPL-MCNC: 93 MG/DL (ref 30–149)
TSI SER-ACNC: 1.36 UIU/ML (ref 0.55–4.78)
VIT B12 SERPL-MCNC: 339 PG/ML (ref 211–911)
VIT D+METAB SERPL-MCNC: 29.9 NG/ML (ref 30–100)
VLDLC SERPL CALC-MCNC: 17 MG/DL (ref 0–30)
WBC # BLD AUTO: 6.9 X10(3) UL (ref 4–11)

## 2025-05-08 PROCEDURE — 84443 ASSAY THYROID STIM HORMONE: CPT

## 2025-05-08 PROCEDURE — 82306 VITAMIN D 25 HYDROXY: CPT

## 2025-05-08 PROCEDURE — 82607 VITAMIN B-12: CPT

## 2025-05-08 PROCEDURE — 80053 COMPREHEN METABOLIC PANEL: CPT

## 2025-05-08 PROCEDURE — 83036 HEMOGLOBIN GLYCOSYLATED A1C: CPT

## 2025-05-08 PROCEDURE — 85025 COMPLETE CBC W/AUTO DIFF WBC: CPT

## 2025-05-08 PROCEDURE — 36415 COLL VENOUS BLD VENIPUNCTURE: CPT

## 2025-05-08 PROCEDURE — 80061 LIPID PANEL: CPT

## 2025-07-22 ENCOUNTER — OFFICE VISIT (OUTPATIENT)
Dept: PHYSICAL MEDICINE AND REHAB | Facility: CLINIC | Age: 50
End: 2025-07-22
Payer: COMMERCIAL

## 2025-07-22 VITALS
SYSTOLIC BLOOD PRESSURE: 112 MMHG | DIASTOLIC BLOOD PRESSURE: 80 MMHG | WEIGHT: 174 LBS | BODY MASS INDEX: 29.71 KG/M2 | HEIGHT: 64 IN

## 2025-07-22 DIAGNOSIS — M25.474 BILATERAL SWELLING OF FEET AND ANKLES: ICD-10-CM

## 2025-07-22 DIAGNOSIS — G89.29 CHRONIC PAIN OF BOTH ANKLES: Primary | ICD-10-CM

## 2025-07-22 DIAGNOSIS — M25.572 CHRONIC PAIN OF BOTH ANKLES: Primary | ICD-10-CM

## 2025-07-22 DIAGNOSIS — M25.471 BILATERAL SWELLING OF FEET AND ANKLES: ICD-10-CM

## 2025-07-22 DIAGNOSIS — M25.373 INSTABILITY OF ANKLE, UNSPECIFIED LATERALITY: ICD-10-CM

## 2025-07-22 DIAGNOSIS — M25.475 BILATERAL SWELLING OF FEET AND ANKLES: ICD-10-CM

## 2025-07-22 DIAGNOSIS — M25.571 CHRONIC PAIN OF BOTH ANKLES: Primary | ICD-10-CM

## 2025-07-22 DIAGNOSIS — M25.472 BILATERAL SWELLING OF FEET AND ANKLES: ICD-10-CM

## 2025-07-22 PROCEDURE — 3008F BODY MASS INDEX DOCD: CPT | Performed by: PHYSICAL MEDICINE & REHABILITATION

## 2025-07-22 PROCEDURE — 3074F SYST BP LT 130 MM HG: CPT | Performed by: PHYSICAL MEDICINE & REHABILITATION

## 2025-07-22 PROCEDURE — 99204 OFFICE O/P NEW MOD 45 MIN: CPT | Performed by: PHYSICAL MEDICINE & REHABILITATION

## 2025-07-22 PROCEDURE — 3079F DIAST BP 80-89 MM HG: CPT | Performed by: PHYSICAL MEDICINE & REHABILITATION

## 2025-07-22 RX ORDER — DICLOFENAC SODIUM 75 MG/1
75 TABLET, DELAYED RELEASE ORAL 2 TIMES DAILY
Qty: 60 TABLET | Refills: 1 | Status: SHIPPED | OUTPATIENT
Start: 2025-07-22

## 2025-07-22 NOTE — H&P
[facility]  Clinic H&P    Requesting Physician: ADI MCKEON MD    CHIEF COMPLAINT:    Chief Complaint   Patient presents with    New Patient     New R handed pt presents with BL foot/ankle pain, R worse. Rates pain 6/10. Admits swelling. Admits pain is causing back pain. Admits NT in BL hands. Admits weakness in BL ankles. Admits ibuprofen PRN.    Patient gives verbal consent to use Abridge.       History of Present Illness  Anastasia Parry is a 49 year old female who presents with bilateral ankle swelling and pain.    She has been experiencing bilateral ankle swelling for an extended period, which worsens by the end of the day. The swelling was present before and after a recent ankle injury. She describes the swelling as 'real swollen' by evening.    She has a history of frequent ankle sprains, describing her ankles as having a tendency to 'roll' easily, leading to multiple twisted ankles over her lifetime. Despite these injuries, she has not experienced swelling until recently.    She reports pain in her ankles, with the right ankle currently being more painful. The pain is described as sensitivity in certain areas of the ankle. She also experiences tingling in her ankles and hands, particularly upon waking, which she attributes to potential carpal tunnel syndrome.    She mentions having eczema on her feet and arthritis in her knees, although the type of arthritis was not specified. She experiences tingling in her arms and hands during sleep, which improves with shaking or wearing a brace.    Her exercise of choice is walking, but the ankle swelling and associated pain have limited her ability to walk comfortably. She is concerned about the impact of this limitation on her daily activities.    She is currently not on any specific medication for her ankle issues.       PAST MEDICAL HISTORY:  Past Medical History[1]    SURGICAL HISTORY:  Past Surgical  History[2]    SOCIAL HISTORY:   Social History     Occupational History    Not on file   Tobacco Use    Smoking status: Former    Smokeless tobacco: Never   Vaping Use    Vaping status: Never Used   Substance and Sexual Activity    Alcohol use: Yes     Comment: rarely    Drug use: Never    Sexual activity: Not on file       FAMILY HISTORY:   Family History[3]    CURRENT MEDICATIONS:   Current Medications[4]    ALLERGIES:   Allergies[5]    REVIEW OF SYSTEMS:   Review of Systems   Constitutional: Negative.    HENT: Negative.    Eyes: Negative.    Respiratory: Negative.    Cardiovascular: Negative.    Gastrointestinal: Negative.    Genitourinary: Negative.    Musculoskeletal: As per HPI   Skin: Negative.    Neurological: As per HPI  Endo/Heme/Allergies: Negative.    Psychiatric/Behavioral: Negative.      All other systems reviewed and are negative. Pertinent positives and negatives noted in the HPI.      PHYSICAL EXAM:   /80   Ht 64\"   Wt 174 lb (78.9 kg)   BMI 29.87 kg/m²     Body mass index is 29.87 kg/m².    General: No immediate distress  Head: Normocephalic/ Atraumatic  Eyes: Extra-occular movements intact.   Ears: No auricular hematoma or deformities  Mouth: No lesions or ulcerations  Heart: peripheral pulses intact. Normal capillary refill.   Lungs: Non-labored respirations  Abdomen: No abdominal guarding  Extremities: No lower extremity edema bilaterally   Skin: No lesions noted.   Cognition: alert & oriented x 3, attentive, able to follow 2 step commands, comprehension intact, spontaneous speech intact  Motor:    Musculoskeletal:    ANKLE/FOOT  Inspection: no erythema, swelling, or obvious deformity.  Palpation: Tender to palpation over the bilateral lateral ankle with lipomas noted at the ankle gutter  ROM: Full active ROM in all planes of the ankle.   Strength: 5 out of 5 in all myotomes of the lower extremities  Sensation: Intact to light touch in all dermatomes of the lower extremities  Reflexes:  2/4 at L4 and S1      Gait:  Normal    Data  [facility] Lab Encounter on 05/08/2025   Component Date Value Ref Range Status    WBC 05/08/2025 6.9  4.0 - 11.0 x10(3) uL Final    RBC 05/08/2025 5.07  3.80 - 5.30 x10(6)uL Final    HGB 05/08/2025 14.0  12.0 - 16.0 g/dL Final    HCT 05/08/2025 42.2  35.0 - 48.0 % Final    MCV 05/08/2025 83.2  80.0 - 100.0 fL Final    MCH 05/08/2025 27.6  26.0 - 34.0 pg Final    MCHC 05/08/2025 33.2  31.0 - 37.0 g/dL Final    RDW-SD 05/08/2025 38.2  35.1 - 46.3 fL Final    RDW 05/08/2025 12.6  11.0 - 15.0 % Final    PLT 05/08/2025 323.0  150.0 - 450.0 10(3)uL Final    Neutrophil Absolute Prelim 05/08/2025 4.04  1.50 - 7.70 x10 (3) uL Final    Neutrophil Absolute 05/08/2025 4.04  1.50 - 7.70 x10(3) uL Final    Lymphocyte Absolute 05/08/2025 2.18  1.00 - 4.00 x10(3) uL Final    Monocyte Absolute 05/08/2025 0.52  0.10 - 1.00 x10(3) uL Final    Eosinophil Absolute 05/08/2025 0.08  0.00 - 0.70 x10(3) uL Final    Basophil Absolute 05/08/2025 0.02  0.00 - 0.20 x10(3) uL Final    Immature Granulocyte Absolute 05/08/2025 0.02  0.00 - 1.00 x10(3) uL Final    Neutrophil % 05/08/2025 58.8  % Final    Lymphocyte % 05/08/2025 31.8  % Final    Monocyte % 05/08/2025 7.6  % Final    Eosinophil % 05/08/2025 1.2  % Final    Basophil % 05/08/2025 0.3  % Final    Immature Granulocyte % 05/08/2025 0.3  % Final    Glucose 05/08/2025 99  70 - 99 mg/dL Final    Sodium 05/08/2025 140  136 - 145 mmol/L Final    Potassium 05/08/2025 4.0  3.5 - 5.1 mmol/L Final    Chloride 05/08/2025 106  98 - 112 mmol/L Final    CO2 05/08/2025 29.0  21.0 - 32.0 mmol/L Final    Anion Gap 05/08/2025 5  0 - 18 mmol/L Final    BUN 05/08/2025 10  9 - 23 mg/dL Final    Creatinine 05/08/2025 0.72  0.55 - 1.02 mg/dL Final    BUN/CREA Ratio 05/08/2025 13.9  10.0 - 20.0 Final    Calcium, Total 05/08/2025 9.3  8.7 - 10.4 mg/dL Final    Calculated Osmolality 05/08/2025 289  275 - 295 mOsm/kg Final    eGFR-Cr 05/08/2025 104  >=60 mL/min/1.73m2 Final     ALT 05/08/2025 15  10 - 49 U/L Final    AST 05/08/2025 16  <34 U/L Final    Alkaline Phosphatase 05/08/2025 51  39 - 100 U/L Final    Bilirubin, Total 05/08/2025 0.5  0.3 - 1.2 mg/dL Final    Total Protein 05/08/2025 7.3  5.7 - 8.2 g/dL Final    Albumin 05/08/2025 4.4  3.2 - 4.8 g/dL Final    Globulin  05/08/2025 2.9  2.0 - 3.5 g/dL Final    A/G Ratio 05/08/2025 1.5  1.0 - 2.0 Final    Patient Fasting for CMP? 05/08/2025 Yes   Final    TSH 05/08/2025 1.359  0.550 - 4.780 uIU/mL Final    Cholesterol, Total 05/08/2025 191  <200 mg/dL Final    HDL Cholesterol 05/08/2025 46  40 - 59 mg/dL Final    Triglycerides 05/08/2025 93  30 - 149 mg/dL Final    LDL Cholesterol 05/08/2025 128 (H)  <100 mg/dL Final    VLDL 05/08/2025 17  0 - 30 mg/dL Final    Non HDL Chol 05/08/2025 145 (H)  <130 mg/dL Final    Patient Fasting for Lipid? 05/08/2025 Yes   Final    HgbA1C 05/08/2025 5.4  <5.7 % Final    Estimated Average Glucose 05/08/2025 108  68 - 126 mg/dL Final    Vitamin B12 05/08/2025 339  211 - 911 pg/mL Final    Vitamin D, 25OH, Total 05/08/2025 29.9 (L)  30.0 - 100.0 ng/mL Final   ]    Radiology Imaging:  I reviewed with the patient her X-ray of the ankle bilateral   EXAM:  XR ANKLE MIN 3 VIEWS BILATERAL     CLINICAL INDICATION: Ankle pain.     COMPARISON:  None.     TECHNIQUE: Three views of bilateral ankles were obtained.     FINDINGS:   There is an intact appearance of the talar bone.  The ankle mortise is   intact.  There is no acute fracture or osseous malalignment.  The articular   surfaces are smooth and regular.  The joint spaces are preserved. There is   no focal soft tissue swelling.      IMPRESSION:   No acute displaced fracture or dislocation.     Electronically Signed by: SULMA KATIB, MD     ASSESSMENT AND PLAN:  Anastasia is a pleasant 49-year-old female presents with bilateral ankle swelling and discomfort in the right ankle after an inversion injury about 4 weeks ago.  She has had x-rays of the bilateral ankles in  September 2024.  I reviewed the report.  I have also performed a brief diagnostic ultrasound where the lipoma is noted in both ankle gutters.  There is no fluid within the ankle joint.  I am recommending formal physical therapy, diclofenac, and follow-up in about 2 months.  If her symptoms continue, then I would recommend MRI of the ankles.  I will see her separately for her knees and low back pain     Assessment & Plan  Chronic bilateral ankle pain and swelling  Chronic pain and swelling likely due to chronic ankle instability. Inflammation around tendons noted on ultrasound.  - Refer to physical therapy for ankle strengthening and stability exercises.  - Prescribe diclofenac, one tablet twice a day for two weeks, then as needed.  - Recommend glucosamine with chondroitin and turmeric supplements.  - Advise to elevate legs and apply ice after walking if swelling occurs.  - Continue walking as tolerated.  - Consider MRI of ankles if not improved in two months.    Chronic ankle instability  Likely due to recurrent inversion sprains.  - Refer to physical therapy for ankle strengthening and stability exercises.    Eczema on feet    Arthritis  Arthritis in knees with unspecified type. Symptoms include tingling in arms and hands, possibly related to carpal tunnel syndrome.  - Recommend glucosamine with chondroitin and turmeric supplements for joint health.    Carpal tunnel syndrome  Symptoms consistent with carpal tunnel syndrome, including tingling in hands and improvement with brace use.    RTC in 2 months  Discharge Instructions were provided as documented in AVS summary.  The patient was in agreement with the assessment and plan.  All questions were answered.  There were no barriers to learning.         1. Chronic pain of both ankles    2. Bilateral swelling of feet and ankles    3. Instability of ankle, unspecified laterality        Alex B. Behar MD  Physical Medicine and Rehabilitation/Sports Medicine  [place]  [facility] [place]  21st Century Cures Act Notice to Patient: Medical documents like this are made available to patients in the interest of transparency. However, be advised this is a medical document and it is intended as [id number] communication between your medical providers. This medical document may contain abbreviations, assessments, medical data, and results or other terms that are unfamiliar. Medical documents are intended to carry relevant information, facts as evident, and the clinical opinion of the practitioner. As such, this medical document may be written in language that appears blunt or direct. You are encouraged to contact your medical provider and/or [facility] if you have any questions about this medical document.   Abridge tool was used for dictation purposes only and the patient was not recorded at any point during the visit.          [1]   Past Medical History:   Allergic rhinitis    Chronic allergic conjunctivitis    Dry eye syndrome of both eyes    Hx of LASIK    Paresis of accommodation, bilateral   [2]   Past Surgical History:  Procedure Laterality Date    Colonoscopy N/A 6/30/2021    Procedure: COLONOSCOPY with biopsy;  Surgeon: Janet Álvarez MD;  Location: [facility] ENDO    Lasik Bilateral 1999    [place]    Other surgical history      Sinus surgery    2016   [3]   Family History  Problem Relation Age of Onset    Diabetes Father     Macular degeneration Mother     Diabetes Sister     Cataracts Other     Hypertension Other     Breast Cancer Maternal Aunt         30-35   [4]   Current Outpatient Medications   Medication Sig Dispense Refill    Fluticasone Propionate (FLONASE NA) Spray 1 spray every day by intranasal route.      Cetirizine HCl 10 MG Oral Cap Take 10 mg by mouth as needed.      diclofenac 75 MG Oral Tab EC Take 1 tablet (75 mg total) by mouth 2 (two) times daily. Take with food for 2 weeks as directed and then as needed. 60 tablet 1     Clindamycin Phosphate 1 % External Gel Use up to twice daily for new pimples on scalp 30 g 2   [5]   Allergies  Allergen Reactions    Eucalyptus Oil SHORTNESS OF BREATH and UNKNOWN    Cat Hair Extract FEVER    Codeine UNKNOWN    Pollen Extract UNKNOWN

## 2025-07-22 NOTE — PATIENT INSTRUCTIONS
1) Please begin physical therapy as soon as possible.   2) Take Diclofenac 75 mg 1 tablet twice per day with food for the next two weeks and then as needed but no more than 2 tablets per day. Do not take with any other NSAIDS (Ibuprofen, Advil, Aleve, Naprosyn etc). OK to take Tylenol 500 mg every 6 hours as needed for pain. If you develop any side effects including stomach aches, nausea, vomiting, or other gastrointestinal symptoms, stop the medication and call my office.   3)  Ice 20 minutes at a time 3-4 times per day  4) Tylenol 500-1000 mg every 6-8 hours as needed for pain.  No more than 3000 mg daily.  5) Take Glucosamine with chondroitin 1500/1200 mg daily.   6) Start turmeric with black pepper 1000 mg twice per day  7) Follow-up with me in 6 to 8 weeks after you  begin physical therapy. If symptoms do not improve, then I would recommend an MRI of the ankle

## 2025-07-22 NOTE — PROGRESS NOTES
The following individual(s) verbally consented to be recorded using ambient AI listening technology and understand that they can each withdraw their consent to this listening technology at any point by asking the clinician to turn off or pause the recording:    Patient name: Anastasia Parry  Additional names:

## 2025-07-28 ENCOUNTER — TELEPHONE (OUTPATIENT)
Dept: PHYSICAL THERAPY | Facility: HOSPITAL | Age: 50
End: 2025-07-28

## 2025-08-18 ENCOUNTER — TELEPHONE (OUTPATIENT)
Dept: PHYSICAL THERAPY | Age: 50
End: 2025-08-18

## (undated) DIAGNOSIS — R19.7 DIARRHEA, UNSPECIFIED TYPE: Primary | ICD-10-CM

## (undated) DEVICE — Device: Brand: DEFENDO AIR/WATER/SUCTION AND BIOPSY VALVE

## (undated) DEVICE — 35 ML SYRINGE REGULAR TIP: Brand: MONOJECT

## (undated) DEVICE — CONMED SCOPE SAVER BITE BLOCK, 20X27 MM: Brand: SCOPE SAVER

## (undated) DEVICE — 6 ML SYRINGE LUER-LOCK TIP: Brand: MONOJECT

## (undated) DEVICE — LINE MNTR ADLT SET O2 INTMD

## (undated) DEVICE — STERILE LATEX POWDER-FREE SURGICAL GLOVESWITH NITRILE COATING: Brand: PROTEXIS

## (undated) DEVICE — MEDI-VAC NON-CONDUCTIVE SUCTION TUBING 6MM X 1.8M (6FT.) L: Brand: CARDINAL HEALTH

## (undated) DEVICE — 3 ML SYRINGE LUER-LOCK TIP: Brand: MONOJECT

## (undated) DEVICE — FORCEP RADIAL JAW 4

## (undated) DEVICE — Device: Brand: CUSTOM PROCEDURE KIT

## (undated) NOTE — LETTER
Date & Time: 11/29/2019, 9:15 PM  Patient: Reed Weeks  Encounter Provider(s):    Elissa Barillas MD       To Whom It May Concern:    Reed Weeks was seen and treated in our department on 11/29/2019.  She can return to work with these limitatio

## (undated) NOTE — ED AVS SNAPSHOT
Adrian Amos   MRN: [id number]    Department:  [facility] Emergency Department   Date of Visit:  11/29/2019           Disclosure     Insurance plans vary and the physician(s) referred by the ER may not be covered by your plan.  Please contact CARE PHYSICIAN AT ONCE OR RETURN IMMEDIATELY TO THE EMERGENCY DEPARTMENT. If you have been prescribed any medication(s), please fill your prescription right away and begin taking the medication(s) as directed.   If you believe that any of the medications

## (undated) NOTE — LETTER
2/16/2021          To Whom It May Concern:    Yessenia Casas is currently under my medical care and may not return to work at this time. Please excuse Merle Bueno for 2/16/2021      If you require additional information please contact our office.         [facility]

## (undated) NOTE — LETTER
01/21/22        Kathryn Faulkner  [address] [facility]      Dear Merline Loco records indicate that you have outstanding lab work and or testing that was ordered for you and has not yet been completed:    [address], EIA     To prov

## (undated) NOTE — LETTER
[address] [place] [address] Rd, [place], [place]       INFORMED REFUSAL FOR TREATMENT / PROCEDURE / TESTING      I have been advised by Dr. Rachel Perez** that it is necessary for me to undergo the following treatment, procedure or testing.   The tr